# Patient Record
Sex: MALE | Race: WHITE | NOT HISPANIC OR LATINO | Employment: OTHER | URBAN - METROPOLITAN AREA
[De-identification: names, ages, dates, MRNs, and addresses within clinical notes are randomized per-mention and may not be internally consistent; named-entity substitution may affect disease eponyms.]

---

## 2018-02-01 ENCOUNTER — RECORDS - HEALTHEAST (OUTPATIENT)
Dept: LAB | Facility: CLINIC | Age: 83
End: 2018-02-01

## 2018-02-01 LAB
ALBUMIN SERPL-MCNC: 3.5 G/DL (ref 3.5–5)
ALP SERPL-CCNC: 133 U/L (ref 45–120)
ALT SERPL W P-5'-P-CCNC: 23 U/L (ref 0–45)
ANION GAP SERPL CALCULATED.3IONS-SCNC: 11 MMOL/L (ref 5–18)
AST SERPL W P-5'-P-CCNC: 19 U/L (ref 0–40)
BILIRUB SERPL-MCNC: 0.8 MG/DL (ref 0–1)
BUN SERPL-MCNC: 24 MG/DL (ref 8–28)
CALCIUM SERPL-MCNC: 9.4 MG/DL (ref 8.5–10.5)
CHLORIDE BLD-SCNC: 97 MMOL/L (ref 98–107)
CO2 SERPL-SCNC: 26 MMOL/L (ref 22–31)
CREAT SERPL-MCNC: 1.2 MG/DL (ref 0.7–1.3)
GFR SERPL CREATININE-BSD FRML MDRD: 58 ML/MIN/1.73M2
GLUCOSE BLD-MCNC: 115 MG/DL (ref 70–125)
POTASSIUM BLD-SCNC: 4.3 MMOL/L (ref 3.5–5)
PROT SERPL-MCNC: 7.1 G/DL (ref 6–8)
SODIUM SERPL-SCNC: 134 MMOL/L (ref 136–145)

## 2018-07-23 ENCOUNTER — RECORDS - HEALTHEAST (OUTPATIENT)
Dept: LAB | Facility: CLINIC | Age: 83
End: 2018-07-23

## 2018-07-23 LAB
CHOLEST SERPL-MCNC: 150 MG/DL
FASTING STATUS PATIENT QL REPORTED: NO
HDLC SERPL-MCNC: 43 MG/DL
LDLC SERPL CALC-MCNC: 89 MG/DL
TRIGL SERPL-MCNC: 89 MG/DL

## 2018-08-13 ENCOUNTER — RECORDS - HEALTHEAST (OUTPATIENT)
Dept: ADMINISTRATIVE | Facility: OTHER | Age: 83
End: 2018-08-13

## 2018-08-14 ENCOUNTER — OFFICE VISIT - HEALTHEAST (OUTPATIENT)
Dept: CARDIOLOGY | Facility: CLINIC | Age: 83
End: 2018-08-14

## 2018-08-14 DIAGNOSIS — I10 ESSENTIAL HYPERTENSION: ICD-10-CM

## 2018-08-14 DIAGNOSIS — I10 BENIGN ESSENTIAL HYPERTENSION: ICD-10-CM

## 2018-08-14 ASSESSMENT — MIFFLIN-ST. JEOR: SCORE: 1523.14

## 2018-08-31 ENCOUNTER — RECORDS - HEALTHEAST (OUTPATIENT)
Dept: LAB | Facility: CLINIC | Age: 83
End: 2018-08-31

## 2018-08-31 LAB
ALBUMIN SERPL-MCNC: 3.8 G/DL (ref 3.5–5)
ALP SERPL-CCNC: 123 U/L (ref 45–120)
ALT SERPL W P-5'-P-CCNC: 16 U/L (ref 0–45)
ANION GAP SERPL CALCULATED.3IONS-SCNC: 8 MMOL/L (ref 5–18)
AST SERPL W P-5'-P-CCNC: 19 U/L (ref 0–40)
BILIRUB SERPL-MCNC: 0.7 MG/DL (ref 0–1)
BUN SERPL-MCNC: 25 MG/DL (ref 8–28)
CALCIUM SERPL-MCNC: 9.9 MG/DL (ref 8.5–10.5)
CHLORIDE BLD-SCNC: 105 MMOL/L (ref 98–107)
CO2 SERPL-SCNC: 26 MMOL/L (ref 22–31)
CREAT SERPL-MCNC: 1.25 MG/DL (ref 0.7–1.3)
GFR SERPL CREATININE-BSD FRML MDRD: 55 ML/MIN/1.73M2
GLUCOSE BLD-MCNC: 92 MG/DL (ref 70–125)
POTASSIUM BLD-SCNC: 4.5 MMOL/L (ref 3.5–5)
PROT SERPL-MCNC: 7.2 G/DL (ref 6–8)
SODIUM SERPL-SCNC: 139 MMOL/L (ref 136–145)

## 2018-09-04 LAB — 25(OH)D3 SERPL-MCNC: 39.7 NG/ML (ref 30–80)

## 2018-11-16 ENCOUNTER — RECORDS - HEALTHEAST (OUTPATIENT)
Dept: LAB | Facility: CLINIC | Age: 83
End: 2018-11-16

## 2018-11-16 LAB
ALBUMIN SERPL-MCNC: 3.5 G/DL (ref 3.5–5)
ANION GAP SERPL CALCULATED.3IONS-SCNC: 10 MMOL/L (ref 5–18)
BUN SERPL-MCNC: 28 MG/DL (ref 8–28)
CALCIUM SERPL-MCNC: 9.4 MG/DL (ref 8.5–10.5)
CHLORIDE BLD-SCNC: 100 MMOL/L (ref 98–107)
CO2 SERPL-SCNC: 26 MMOL/L (ref 22–31)
CREAT SERPL-MCNC: 1.42 MG/DL (ref 0.7–1.3)
GFR SERPL CREATININE-BSD FRML MDRD: 48 ML/MIN/1.73M2
GLUCOSE BLD-MCNC: 102 MG/DL (ref 70–125)
PHOSPHATE SERPL-MCNC: 2.7 MG/DL (ref 2.5–4.5)
POTASSIUM BLD-SCNC: 4.1 MMOL/L (ref 3.5–5)
SODIUM SERPL-SCNC: 136 MMOL/L (ref 136–145)

## 2019-02-25 ENCOUNTER — RECORDS - HEALTHEAST (OUTPATIENT)
Dept: LAB | Facility: CLINIC | Age: 84
End: 2019-02-25

## 2019-02-25 LAB
ALBUMIN SERPL-MCNC: 3.8 G/DL (ref 3.5–5)
ANION GAP SERPL CALCULATED.3IONS-SCNC: 10 MMOL/L (ref 5–18)
BUN SERPL-MCNC: 23 MG/DL (ref 8–28)
CALCIUM SERPL-MCNC: 9.5 MG/DL (ref 8.5–10.5)
CHLORIDE BLD-SCNC: 98 MMOL/L (ref 98–107)
CO2 SERPL-SCNC: 25 MMOL/L (ref 22–31)
CREAT SERPL-MCNC: 1.3 MG/DL (ref 0.7–1.3)
GFR SERPL CREATININE-BSD FRML MDRD: 53 ML/MIN/1.73M2
GLUCOSE BLD-MCNC: 86 MG/DL (ref 70–125)
PHOSPHATE SERPL-MCNC: 3.2 MG/DL (ref 2.5–4.5)
POTASSIUM BLD-SCNC: 4.5 MMOL/L (ref 3.5–5)
PSA SERPL-MCNC: 0.5 NG/ML (ref 0–6.5)
SODIUM SERPL-SCNC: 133 MMOL/L (ref 136–145)

## 2019-09-12 ENCOUNTER — COMMUNICATION - HEALTHEAST (OUTPATIENT)
Dept: CARDIOLOGY | Facility: CLINIC | Age: 84
End: 2019-09-12

## 2019-09-12 DIAGNOSIS — I10 ESSENTIAL HYPERTENSION: ICD-10-CM

## 2019-09-12 RX ORDER — LISINOPRIL 20 MG/1
TABLET ORAL
Qty: 90 TABLET | Refills: 0 | Status: SHIPPED | OUTPATIENT
Start: 2019-09-12 | End: 2023-01-01

## 2019-10-01 ENCOUNTER — AMBULATORY - HEALTHEAST (OUTPATIENT)
Dept: CARDIOLOGY | Facility: CLINIC | Age: 84
End: 2019-10-01

## 2019-10-01 ENCOUNTER — RECORDS - HEALTHEAST (OUTPATIENT)
Dept: ADMINISTRATIVE | Facility: OTHER | Age: 84
End: 2019-10-01

## 2019-12-10 ENCOUNTER — COMMUNICATION - HEALTHEAST (OUTPATIENT)
Dept: CARDIOLOGY | Facility: CLINIC | Age: 84
End: 2019-12-10

## 2019-12-10 DIAGNOSIS — I10 ESSENTIAL HYPERTENSION: ICD-10-CM

## 2019-12-13 ENCOUNTER — COMMUNICATION - HEALTHEAST (OUTPATIENT)
Dept: CARDIOLOGY | Facility: CLINIC | Age: 84
End: 2019-12-13

## 2019-12-13 DIAGNOSIS — I10 ESSENTIAL HYPERTENSION: ICD-10-CM

## 2019-12-20 ENCOUNTER — COMMUNICATION - HEALTHEAST (OUTPATIENT)
Dept: CARDIOLOGY | Facility: CLINIC | Age: 84
End: 2019-12-20

## 2019-12-20 DIAGNOSIS — I10 ESSENTIAL HYPERTENSION: ICD-10-CM

## 2020-01-22 ENCOUNTER — RECORDS - HEALTHEAST (OUTPATIENT)
Dept: LAB | Facility: CLINIC | Age: 85
End: 2020-01-22

## 2020-01-22 LAB
ALBUMIN SERPL-MCNC: 3.6 G/DL (ref 3.5–5)
ALP SERPL-CCNC: 114 U/L (ref 45–120)
ALT SERPL W P-5'-P-CCNC: 14 U/L (ref 0–45)
ANION GAP SERPL CALCULATED.3IONS-SCNC: 8 MMOL/L (ref 5–18)
AST SERPL W P-5'-P-CCNC: 17 U/L (ref 0–40)
BILIRUB SERPL-MCNC: 0.7 MG/DL (ref 0–1)
BUN SERPL-MCNC: 20 MG/DL (ref 8–28)
CALCIUM SERPL-MCNC: 8.9 MG/DL (ref 8.5–10.5)
CHLORIDE BLD-SCNC: 96 MMOL/L (ref 98–107)
CO2 SERPL-SCNC: 27 MMOL/L (ref 22–31)
CREAT SERPL-MCNC: 1.28 MG/DL (ref 0.7–1.3)
GFR SERPL CREATININE-BSD FRML MDRD: 53 ML/MIN/1.73M2
GLUCOSE BLD-MCNC: 118 MG/DL (ref 70–125)
POTASSIUM BLD-SCNC: 3.8 MMOL/L (ref 3.5–5)
PROT SERPL-MCNC: 6.4 G/DL (ref 6–8)
SODIUM SERPL-SCNC: 131 MMOL/L (ref 136–145)

## 2020-02-17 ENCOUNTER — AMBULATORY - HEALTHEAST (OUTPATIENT)
Dept: OTHER | Facility: CLINIC | Age: 85
End: 2020-02-17

## 2020-02-19 ENCOUNTER — RECORDS - HEALTHEAST (OUTPATIENT)
Dept: ADMINISTRATIVE | Facility: OTHER | Age: 85
End: 2020-02-19

## 2020-02-19 ENCOUNTER — AMBULATORY - HEALTHEAST (OUTPATIENT)
Dept: CARDIOLOGY | Facility: CLINIC | Age: 85
End: 2020-02-19

## 2020-02-27 ENCOUNTER — OFFICE VISIT - HEALTHEAST (OUTPATIENT)
Dept: CARDIOLOGY | Facility: CLINIC | Age: 85
End: 2020-02-27

## 2020-02-27 DIAGNOSIS — I95.1 ORTHOSTATIC HYPOTENSION: ICD-10-CM

## 2020-02-27 DIAGNOSIS — I10 BENIGN ESSENTIAL HYPERTENSION: ICD-10-CM

## 2020-02-27 ASSESSMENT — MIFFLIN-ST. JEOR: SCORE: 1509.54

## 2020-03-16 ENCOUNTER — AMBULATORY - HEALTHEAST (OUTPATIENT)
Dept: GERIATRICS | Facility: CLINIC | Age: 85
End: 2020-03-16

## 2020-03-16 ENCOUNTER — OFFICE VISIT - HEALTHEAST (OUTPATIENT)
Dept: GERIATRICS | Facility: CLINIC | Age: 85
End: 2020-03-16

## 2020-03-16 DIAGNOSIS — R55 VASOVAGAL SYNCOPE: ICD-10-CM

## 2020-03-16 DIAGNOSIS — K21.9 GASTROESOPHAGEAL REFLUX DISEASE WITHOUT ESOPHAGITIS: ICD-10-CM

## 2020-03-16 DIAGNOSIS — M00.062 STAPHYLOCOCCAL ARTHRITIS OF LEFT KNEE (H): ICD-10-CM

## 2020-03-16 DIAGNOSIS — F51.01 PRIMARY INSOMNIA: ICD-10-CM

## 2020-03-16 DIAGNOSIS — I10 BENIGN ESSENTIAL HYPERTENSION: ICD-10-CM

## 2020-03-16 DIAGNOSIS — A41.9 SEPTICEMIA (H): ICD-10-CM

## 2020-03-16 RX ORDER — ACETAMINOPHEN 500 MG
1000 TABLET ORAL EVERY 6 HOURS PRN
Status: SHIPPED | COMMUNITY
Start: 2020-03-16

## 2020-03-16 RX ORDER — LANOLIN ALCOHOL/MO/W.PET/CERES
3 CREAM (GRAM) TOPICAL
Status: SHIPPED | COMMUNITY
Start: 2020-03-16

## 2020-03-17 ENCOUNTER — OFFICE VISIT - HEALTHEAST (OUTPATIENT)
Dept: GERIATRICS | Facility: CLINIC | Age: 85
End: 2020-03-17

## 2020-03-17 ENCOUNTER — RECORDS - HEALTHEAST (OUTPATIENT)
Dept: LAB | Facility: CLINIC | Age: 85
End: 2020-03-17

## 2020-03-17 DIAGNOSIS — Z96.652 HISTORY OF TOTAL LEFT KNEE REPLACEMENT: ICD-10-CM

## 2020-03-17 DIAGNOSIS — E78.5 HYPERLIPIDEMIA, UNSPECIFIED HYPERLIPIDEMIA TYPE: ICD-10-CM

## 2020-03-17 DIAGNOSIS — I10 BENIGN ESSENTIAL HYPERTENSION: ICD-10-CM

## 2020-03-17 DIAGNOSIS — R74.01 TRANSAMINITIS: ICD-10-CM

## 2020-03-17 DIAGNOSIS — D64.9 ANEMIA, UNSPECIFIED TYPE: ICD-10-CM

## 2020-03-17 DIAGNOSIS — E87.1 HYPONATREMIA: ICD-10-CM

## 2020-03-18 ENCOUNTER — COMMUNICATION - HEALTHEAST (OUTPATIENT)
Dept: GERIATRICS | Facility: CLINIC | Age: 85
End: 2020-03-18

## 2020-03-18 LAB
ALBUMIN SERPL-MCNC: 2.4 G/DL (ref 3.5–5)
ALP SERPL-CCNC: 140 U/L (ref 45–120)
ALT SERPL W P-5'-P-CCNC: 25 U/L (ref 0–45)
ANION GAP SERPL CALCULATED.3IONS-SCNC: 7 MMOL/L (ref 5–18)
AST SERPL W P-5'-P-CCNC: 33 U/L (ref 0–40)
BILIRUB SERPL-MCNC: 0.3 MG/DL (ref 0–1)
BUN SERPL-MCNC: 14 MG/DL (ref 8–28)
CALCIUM SERPL-MCNC: 7.9 MG/DL (ref 8.5–10.5)
CHLORIDE BLD-SCNC: 99 MMOL/L (ref 98–107)
CO2 SERPL-SCNC: 26 MMOL/L (ref 22–31)
CREAT SERPL-MCNC: 0.97 MG/DL (ref 0.7–1.3)
ERYTHROCYTE [DISTWIDTH] IN BLOOD BY AUTOMATED COUNT: 14.6 % (ref 11–14.5)
GFR SERPL CREATININE-BSD FRML MDRD: >60 ML/MIN/1.73M2
GLUCOSE BLD-MCNC: 107 MG/DL (ref 70–125)
HCT VFR BLD AUTO: 20 % (ref 40–54)
HGB BLD-MCNC: 6.6 G/DL (ref 14–18)
MAGNESIUM SERPL-MCNC: 1.7 MG/DL (ref 1.8–2.6)
MCH RBC QN AUTO: 32.4 PG (ref 27–34)
MCHC RBC AUTO-ENTMCNC: 33 G/DL (ref 32–36)
MCV RBC AUTO: 98 FL (ref 80–100)
PLATELET # BLD AUTO: 490 THOU/UL (ref 140–440)
PMV BLD AUTO: 9.2 FL (ref 8.5–12.5)
POTASSIUM BLD-SCNC: 3.7 MMOL/L (ref 3.5–5)
PROT SERPL-MCNC: 5.4 G/DL (ref 6–8)
RBC # BLD AUTO: 2.04 MILL/UL (ref 4.4–6.2)
SODIUM SERPL-SCNC: 132 MMOL/L (ref 136–145)
WBC: 10 THOU/UL (ref 4–11)

## 2020-03-18 ASSESSMENT — MIFFLIN-ST. JEOR: SCORE: 1490.03

## 2020-03-19 ENCOUNTER — SURGERY - HEALTHEAST (OUTPATIENT)
Dept: SURGERY | Facility: CLINIC | Age: 85
End: 2020-03-19

## 2020-03-19 ENCOUNTER — ANESTHESIA - HEALTHEAST (OUTPATIENT)
Dept: SURGERY | Facility: CLINIC | Age: 85
End: 2020-03-19

## 2020-03-20 ASSESSMENT — MIFFLIN-ST. JEOR: SCORE: 1453.29

## 2020-03-23 ENCOUNTER — RECORDS - HEALTHEAST (OUTPATIENT)
Dept: LAB | Facility: CLINIC | Age: 85
End: 2020-03-23

## 2020-03-23 ENCOUNTER — OFFICE VISIT - HEALTHEAST (OUTPATIENT)
Dept: GERIATRICS | Facility: CLINIC | Age: 85
End: 2020-03-23

## 2020-03-23 DIAGNOSIS — A41.9 SEPTICEMIA (H): ICD-10-CM

## 2020-03-23 DIAGNOSIS — D62 ANEMIA DUE TO BLOOD LOSS, ACUTE: ICD-10-CM

## 2020-03-23 DIAGNOSIS — M17.12 ARTHRITIS OF LEFT KNEE: ICD-10-CM

## 2020-03-24 ENCOUNTER — OFFICE VISIT - HEALTHEAST (OUTPATIENT)
Dept: GERIATRICS | Facility: CLINIC | Age: 85
End: 2020-03-24

## 2020-03-24 DIAGNOSIS — K92.2 UPPER GI BLEED: ICD-10-CM

## 2020-03-24 DIAGNOSIS — Z96.652 HISTORY OF TOTAL LEFT KNEE REPLACEMENT: ICD-10-CM

## 2020-03-24 DIAGNOSIS — I10 ESSENTIAL HYPERTENSION, BENIGN: ICD-10-CM

## 2020-03-24 DIAGNOSIS — D62 ANEMIA DUE TO BLOOD LOSS, ACUTE: ICD-10-CM

## 2020-03-24 DIAGNOSIS — M00.062 STAPHYLOCOCCAL ARTHRITIS OF LEFT KNEE (H): ICD-10-CM

## 2020-03-25 LAB — H PYLORI AG STL QL IA: NEGATIVE

## 2020-03-30 ENCOUNTER — OFFICE VISIT - HEALTHEAST (OUTPATIENT)
Dept: GERIATRICS | Facility: CLINIC | Age: 85
End: 2020-03-30

## 2020-03-30 ENCOUNTER — RECORDS - HEALTHEAST (OUTPATIENT)
Dept: LAB | Facility: CLINIC | Age: 85
End: 2020-03-30

## 2020-03-30 DIAGNOSIS — K92.2 UPPER GI BLEED: ICD-10-CM

## 2020-03-30 DIAGNOSIS — M00.062 STAPHYLOCOCCAL ARTHRITIS OF LEFT KNEE (H): ICD-10-CM

## 2020-03-31 LAB
ALBUMIN SERPL-MCNC: 2.9 G/DL (ref 3.5–5)
ALP SERPL-CCNC: 160 U/L (ref 45–120)
ALT SERPL W P-5'-P-CCNC: 13 U/L (ref 0–45)
AST SERPL W P-5'-P-CCNC: 21 U/L (ref 0–40)
BASOPHILS # BLD AUTO: 0.1 THOU/UL (ref 0–0.2)
BASOPHILS NFR BLD AUTO: 2 % (ref 0–2)
BILIRUB DIRECT SERPL-MCNC: 0.2 MG/DL
BILIRUB SERPL-MCNC: 0.5 MG/DL (ref 0–1)
BUN SERPL-MCNC: 16 MG/DL (ref 8–28)
C REACTIVE PROTEIN LHE: 0.3 MG/DL (ref 0–0.8)
CREAT SERPL-MCNC: 0.99 MG/DL (ref 0.7–1.3)
EOSINOPHIL # BLD AUTO: 0.5 THOU/UL (ref 0–0.4)
EOSINOPHIL NFR BLD AUTO: 8 % (ref 0–6)
ERYTHROCYTE [DISTWIDTH] IN BLOOD BY AUTOMATED COUNT: 14.4 % (ref 11–14.5)
GFR SERPL CREATININE-BSD FRML MDRD: >60 ML/MIN/1.73M2
HCT VFR BLD AUTO: 33.1 % (ref 40–54)
HGB BLD-MCNC: 10.6 G/DL (ref 14–18)
LYMPHOCYTES # BLD AUTO: 2 THOU/UL (ref 0.8–4.4)
LYMPHOCYTES NFR BLD AUTO: 30 % (ref 20–40)
MCH RBC QN AUTO: 30.5 PG (ref 27–34)
MCHC RBC AUTO-ENTMCNC: 32 G/DL (ref 32–36)
MCV RBC AUTO: 95 FL (ref 80–100)
MONOCYTES # BLD AUTO: 0.6 THOU/UL (ref 0–0.9)
MONOCYTES NFR BLD AUTO: 9 % (ref 2–10)
NEUTROPHILS # BLD AUTO: 3.3 THOU/UL (ref 2–7.7)
NEUTROPHILS NFR BLD AUTO: 51 % (ref 50–70)
PLATELET # BLD AUTO: 344 THOU/UL (ref 140–440)
PMV BLD AUTO: 10 FL (ref 8.5–12.5)
PROT SERPL-MCNC: 6.4 G/DL (ref 6–8)
RBC # BLD AUTO: 3.48 MILL/UL (ref 4.4–6.2)
WBC: 6.5 THOU/UL (ref 4–11)

## 2020-04-01 ENCOUNTER — AMBULATORY - HEALTHEAST (OUTPATIENT)
Dept: GERIATRICS | Facility: CLINIC | Age: 85
End: 2020-04-01

## 2020-06-10 ENCOUNTER — RECORDS - HEALTHEAST (OUTPATIENT)
Dept: LAB | Facility: CLINIC | Age: 85
End: 2020-06-10

## 2020-06-10 LAB
BASOPHILS # BLD AUTO: 0.1 THOU/UL (ref 0–0.2)
BASOPHILS NFR BLD AUTO: 1 % (ref 0–2)
C REACTIVE PROTEIN LHE: 0.2 MG/DL (ref 0–0.8)
CREAT SERPL-MCNC: 1.27 MG/DL (ref 0.7–1.3)
EOSINOPHIL # BLD AUTO: 0.2 THOU/UL (ref 0–0.4)
EOSINOPHIL NFR BLD AUTO: 3 % (ref 0–6)
ERYTHROCYTE [DISTWIDTH] IN BLOOD BY AUTOMATED COUNT: 13.9 % (ref 11–14.5)
GFR SERPL CREATININE-BSD FRML MDRD: 54 ML/MIN/1.73M2
HCT VFR BLD AUTO: 40.1 % (ref 40–54)
HGB BLD-MCNC: 12.9 G/DL (ref 14–18)
LYMPHOCYTES # BLD AUTO: 2.6 THOU/UL (ref 0.8–4.4)
LYMPHOCYTES NFR BLD AUTO: 34 % (ref 20–40)
MCH RBC QN AUTO: 30.4 PG (ref 27–34)
MCHC RBC AUTO-ENTMCNC: 32.2 G/DL (ref 32–36)
MCV RBC AUTO: 95 FL (ref 80–100)
MONOCYTES # BLD AUTO: 0.7 THOU/UL (ref 0–0.9)
MONOCYTES NFR BLD AUTO: 10 % (ref 2–10)
NEUTROPHILS # BLD AUTO: 4 THOU/UL (ref 2–7.7)
NEUTROPHILS NFR BLD AUTO: 52 % (ref 50–70)
PLATELET # BLD AUTO: 257 THOU/UL (ref 140–440)
PMV BLD AUTO: 11.2 FL (ref 8.5–12.5)
RBC # BLD AUTO: 4.24 MILL/UL (ref 4.4–6.2)
WBC: 7.6 THOU/UL (ref 4–11)

## 2021-01-06 ENCOUNTER — RECORDS - HEALTHEAST (OUTPATIENT)
Dept: LAB | Facility: CLINIC | Age: 86
End: 2021-01-06

## 2021-01-06 LAB
ANION GAP SERPL CALCULATED.3IONS-SCNC: 10 MMOL/L (ref 5–18)
BUN SERPL-MCNC: 22 MG/DL (ref 8–28)
CALCIUM SERPL-MCNC: 9.3 MG/DL (ref 8.5–10.5)
CHLORIDE BLD-SCNC: 104 MMOL/L (ref 98–107)
CO2 SERPL-SCNC: 25 MMOL/L (ref 22–31)
CREAT SERPL-MCNC: 1.03 MG/DL (ref 0.7–1.3)
GFR SERPL CREATININE-BSD FRML MDRD: >60 ML/MIN/1.73M2
GLUCOSE BLD-MCNC: 94 MG/DL (ref 70–125)
POTASSIUM BLD-SCNC: 4.3 MMOL/L (ref 3.5–5)
SODIUM SERPL-SCNC: 139 MMOL/L (ref 136–145)

## 2021-05-29 ENCOUNTER — RECORDS - HEALTHEAST (OUTPATIENT)
Dept: ADMINISTRATIVE | Facility: CLINIC | Age: 86
End: 2021-05-29

## 2021-05-31 ENCOUNTER — RECORDS - HEALTHEAST (OUTPATIENT)
Dept: ADMINISTRATIVE | Facility: CLINIC | Age: 86
End: 2021-05-31

## 2021-06-01 VITALS — BODY MASS INDEX: 30.61 KG/M2 | WEIGHT: 195 LBS | HEIGHT: 67 IN

## 2021-06-04 VITALS
DIASTOLIC BLOOD PRESSURE: 78 MMHG | BODY MASS INDEX: 31.22 KG/M2 | HEART RATE: 77 BPM | TEMPERATURE: 97.7 F | SYSTOLIC BLOOD PRESSURE: 160 MMHG | WEIGHT: 193.4 LBS

## 2021-06-04 VITALS
HEIGHT: 67 IN | HEART RATE: 80 BPM | WEIGHT: 192 LBS | DIASTOLIC BLOOD PRESSURE: 68 MMHG | SYSTOLIC BLOOD PRESSURE: 134 MMHG | BODY MASS INDEX: 30.13 KG/M2 | RESPIRATION RATE: 16 BRPM

## 2021-06-04 VITALS
DIASTOLIC BLOOD PRESSURE: 70 MMHG | WEIGHT: 193 LBS | SYSTOLIC BLOOD PRESSURE: 168 MMHG | OXYGEN SATURATION: 98 % | HEART RATE: 93 BPM | TEMPERATURE: 97.6 F | BODY MASS INDEX: 30.23 KG/M2 | RESPIRATION RATE: 16 BRPM

## 2021-06-04 VITALS
WEIGHT: 177 LBS | SYSTOLIC BLOOD PRESSURE: 161 MMHG | HEART RATE: 85 BPM | TEMPERATURE: 98 F | DIASTOLIC BLOOD PRESSURE: 76 MMHG | BODY MASS INDEX: 28.57 KG/M2

## 2021-06-04 VITALS — BODY MASS INDEX: 29.43 KG/M2 | HEIGHT: 66 IN | WEIGHT: 183.1 LBS

## 2021-06-04 NOTE — TELEPHONE ENCOUNTER
Refuse refill for Lisinopril. Pt has not seen cardiologist in over 1 year. Contact PCP.  No follow with Cardiology needed.

## 2021-06-04 NOTE — TELEPHONE ENCOUNTER
Refuse refill for Lisinopril. Pt has not seen cardiologist in over 1 year. Contact PCP. No follow up needed with [facility].

## 2021-06-06 NOTE — PROGRESS NOTES
[facility] Admission note      Patient: Javier Carrillo  MRN: [id number]  Date of Service: 3/17/2020      [facility] [[id number]]  Reason for Visit     Chief Complaint   Patient presents with     H & P   Follow-up knee infection, recent hospital stay    Code Status     Full code    Assessment     -Acute septicemia with sepsis with suspicion for left knee septic joint with recent knee replacement status post I&D and poly-exchange on 3/10/2020  -History of traumatic rhabdomyolysis  -Acute kidney injury  -Mild hyponatremia.  -Elevated LFTs with transaminitis  -History of hypertension  -- hld  -Pain management  -Generalized debilitation discharge to the TCU for strengthening and rehab  -Blood loss anemia on discharge hemoglobin stable at 9.6.      Plan     Patient has been admitted to the TCU.  Based on orthopedic recommendation he will be weightbearing as tolerated.  Recommended to keep knee immobilizer on all times to the left lower extremity during ambulation for soft tissue healing  He will continue with ceftriaxone 2 g daily for the next 10 days.  He has a PICC line in place.  He is tolerating his antibiotics well  No recheck labs has been ordered for him plan is to recheck another set of labs on him closely  For DVT prophylaxis he will be on Xarelto 10 mg daily.  Close follow-up with orthopedics.  Close follow-up with infectious disease prior to discontinuation of antibiotics  He has an appointment for tomorrow.  DC his CQ 10 for polypharmacy concern.  Blood pressures are stable now that he is back on lisinopril with no hypotension episode noted.  Continue with his PT OT and rehab.  Family is concerned about his discharge orders he has been given range of movement without immobilizer while in bed which his family is contesting so physical therapy will be contacting orthopedist to clarify order.  Recheck routine labs.  Follow-up with orthopedics closely as scheduled    History      Patient is a very pleasant 85 y.o. male who is admitted to TCU  Patient has a history of left knee arthroplasty and presented with suspicion for left knee septic joint.  He was admitted in the hospital and underwent I&D and poly-exchange on 3/10/2020.  Postoperatively MRSA swab was negative he was growing small amount of staph aureus in the synovial fluid infectious disease was consulted and he has been discharged on antibiotics.  He has been discharged on IV ceftriaxone 2 g IV daily for 10 days    He also developed traumatic rhabdomyolysis with acute renal insufficiency and was given IV hydration with improvement in renal function.  Postoperative course complicated by elevated LFTs this was felt to be systemic response to infection they recommended close monitoring.    Patient had a episode of hypotension associated with a large BM this was felt to be vasovagal in nature and recommended close monitoring.  He developed hyponatremia however sodiums were stable and were monitored.    Due to renal insufficiency lisinopril was held prior to discharge however medication was restarted with advised to monitor kidney functions  He denies any concerns      Past Medical History     Active Ambulatory (Non-Hospital) Problems    Diagnosis     Arthritis of left knee     Hyperlipidemia     Malignant melanoma of upper arm (H)     Osteopenia     History of total knee replacement     Benign essential hypertension     Chest pain     Gastroesophageal reflux disease without esophagitis     Dyspnea on exertion     Orthostatic hypotension     Past Medical History:   Diagnosis Date     Acute hyponatremia      GUSTAVO (acute kidney injury) (H)      GERD (gastroesophageal reflux disease)      Hearing loss      HTN (hypertension)      Hypertriglyceridemia      Melanoma (H) 05/2015     Septicemia (H)      SVT (supraventricular tachycardia) (H) 1999     Traumatic rhabdomyolysis (H)        Past Social History     Reviewed, and he  reports that  he has never smoked. He has never used smokeless tobacco. He reports that he does not drink alcohol or use drugs.    Family History     Reviewed, and family history includes Heart failure (age of onset: 80) in his sister; Pancreatic cancer in his brother.    Medication List   Post Discharge Medication Reconciliation Status: discharge medications reconciled and changed, per note/orders (see AVS)   Current Outpatient Medications on File Prior to Visit   Medication Sig Dispense Refill     acetaminophen (TYLENOL) 500 MG tablet Take 1,000 mg by mouth every 6 (six) hours as needed for pain.       aspirin-acetaminophen-caffeine (EXCEDRIN MIGRAINE) 250-250-65 mg per tablet Take 1 tablet by mouth daily.        calcium carbonate-vitamin D3 (OS-DEVON 250+ D) 250-125 mg-unit Tab per tablet Take 1 tablet by mouth daily.       calcium, as carbonate, (TUMS) 200 mg calcium (500 mg) chewable tablet Chew 2 tablets every 2 (two) hours as needed for heartburn.        cefTRIAXone 2 gram SolR Infuse 2,000 mg into a venous catheter daily.       lisinopril (PRINIVIL,ZESTRIL) 20 MG tablet TAKE 1 TABLET BY MOUTH EVERY DAY 90 tablet 0     melatonin 3 mg Tab tablet Take 3 mg by mouth at bedtime.       omeprazole (PRILOSEC) 20 MG capsule Take 1 capsule (20 mg total) by mouth daily before breakfast. 30 capsule 0     traMADoL (ULTRAM) 50 mg tablet Take 50 mg by mouth every 6 (six) hours as needed for pain.       ubidecarenone (COENZYME Q10) 100 mg Tab tablet Take 100 mg by mouth daily.        Current Facility-Administered Medications on File Prior to Visit   Medication Dose Route Frequency Provider Last Rate Last Dose     [DISCONTINUED] GENERIC EXTERNAL MEDICATION     GENERIC EXTERNAL DATA PROVIDER           Allergies     Allergies   Allergen Reactions     Levaquin [Levofloxacin]      Tendon pain       Review of Systems   A comprehensive review of 14 systems was done. Pertinent findings noted here and in history of present illness. All the rest  negative.  Constitutional: Negative.  Negative for fever, chills, he has activity change, appetite change and fatigue.   HENT: Negative for congestion and facial swelling.    Eyes: Negative for photophobia, redness and visual disturbance.   Respiratory: Negative for cough and chest tightness.    Cardiovascular: Negative for chest pain, palpitations and has chronic  leg swelling.   Gastrointestinal: Negative for nausea, diarrhea, constipation, blood in stool and abdominal distention.   Genitourinary: Negative.    Musculoskeletal: Negative.  Compliant with his left knee immobilizer  Skin: Negative.    Neurological: Negative for dizziness, tremors, syncope, weakness, light-headedness and headaches.   Hematological: Does not bruise/bleed easily.   Psychiatric/Behavioral: Negative.        Physical Exam     Recent Vitals 3/16/2020   Height -   Weight 193 lbs   BSA (m2) 2.03 m2   /70   Pulse 93   Temp 97.6   Temp src -   SpO2 98   Some recent data might be hidden       Constitutional: Oriented to person, place, and time and appears well-developed.   HEENT:  Normocephalic and atraumatic.  Eyes: Conjunctivae and EOM are normal. Pupils are equal, round, and reactive to light. No discharge.  No scleral icterus. Nose normal. Mouth/Throat: Oropharynx is clear and moist. No oropharyngeal exudate.    NECK: Normal range of motion. Neck supple. No JVD present. No tracheal deviation present. No thyromegaly present.   CARDIOVASCULAR: Normal rate, regular rhythm and intact distal pulses.  Exam reveals no gallop and no friction rub.  Systolic murmur present.  PULMONARY: Effort normal and breath sounds normal. No respiratory distress.No Wheezing or rales.  ABDOMEN: Soft. Bowel sounds are normal. No distension and no mass.  There is no tenderness. There is no rebound and no guarding. No HSM.  MUSCULOSKELETAL: Normal range of motion.  He has leg edema and no tenderness. Mild kyphosis, no tenderness.  Left knee has an intact surgical  incision covered with Mepilex with a knee immobilizer noted in place  LYMPH NODES: Has no cervical, supraclavicular, axillary and groin adenopathy.   NEUROLOGICAL: Alert and oriented to person, place, and time. No cranial nerve deficit.  Normal muscle tone. Coordination normal.   GENITOURINARY: Deferred exam.  SKIN: Skin is warm and dry. No rash noted. No erythema. No pallor.   EXTREMITIES: No cyanosis, no clubbing, he has leg edema. No Deformity.  PSYCHIATRIC: Normal mood, affect and behavior.      Lab Results     Recent Results (from the past 240 hour(s))   CK Total    Collection Time: 03/08/20  7:41 PM   Result Value Ref Range    CK, Total 10,121 (HH) 30 - 190 U/L   Comprehensive Metabolic Panel    Collection Time: 03/08/20  7:41 PM   Result Value Ref Range    Sodium 129 (L) 136 - 145 mmol/L    Potassium 4.5 3.5 - 5.0 mmol/L    Chloride 96 (L) 98 - 107 mmol/L    CO2 22 22 - 31 mmol/L    Anion Gap, Calculation 11 5 - 18 mmol/L    Glucose 108 70 - 125 mg/dL    BUN 40 (H) 8 - 28 mg/dL    Creatinine 1.62 (H) 0.70 - 1.30 mg/dL    GFR MDRD Af Amer 49 (L) >60 mL/min/1.73m2    GFR MDRD Non Af Amer 41 (L) >60 mL/min/1.73m2    Bilirubin, Total 0.7 0.0 - 1.0 mg/dL    Calcium 9.1 8.5 - 10.5 mg/dL    Protein, Total 7.1 6.0 - 8.0 g/dL    Albumin 3.1 (L) 3.5 - 5.0 g/dL    Alkaline Phosphatase 131 (H) 45 - 120 U/L     (H) 0 - 40 U/L    ALT 96 (H) 0 - 45 U/L   Lactic Acid    Collection Time: 03/08/20  7:41 PM   Result Value Ref Range    Lactic Acid 1.7 0.5 - 2.2 mmol/L   Blood culture from PERIPHERAL SITE    Collection Time: 03/08/20  7:41 PM    Specimen: Vein, Peripheral; Blood   Result Value Ref Range    Anaerobic Blood Culture Bottle No Growth No Growth, No organisms seen, bottle returned to instrument, Specimen not received, No Growth at 24 hours, No Growth at 48 hours, No Growth at 72 hours, No Growth at 96 hours, No Growth at 120 hours    Aerobic Blood Culture Bottle No Growth No Growth, No organisms seen, bottle  returned to instrument, Specimen not received, No Growth at 24 hours, No Growth at 120 hours, No Growth at 48 hours, No Growth at 72 hours, No Growth at 96 hours   HM1 (CBC with Diff)    Collection Time: 03/08/20  7:41 PM   Result Value Ref Range    WBC 17.5 (H) 4.0 - 11.0 thou/uL    RBC 4.04 (L) 4.40 - 6.20 mill/uL    Hemoglobin 13.0 (L) 14.0 - 18.0 g/dL    Hematocrit 37.6 (L) 40.0 - 54.0 %    MCV 93 80 - 100 fL    MCH 32.2 27.0 - 34.0 pg    MCHC 34.6 32.0 - 36.0 g/dL    RDW 13.4 11.0 - 14.5 %    Platelets 285 140 - 440 thou/uL    MPV 10.1 8.5 - 12.5 fL    Neutrophils % 83 (H) 50 - 70 %    Lymphocytes % 9 (L) 20 - 40 %    Monocytes % 7 2 - 10 %    Eosinophils % 0 0 - 6 %    Basophils % 0 0 - 2 %    Neutrophils Absolute 14.5 (H) 2.0 - 7.7 thou/uL    Lymphocytes Absolute 1.6 0.8 - 4.4 thou/uL    Monocytes Absolute 1.2 (H) 0.0 - 0.9 thou/uL    Eosinophils Absolute 0.1 0.0 - 0.4 thou/uL    Basophils Absolute 0.0 0.0 - 0.2 thou/uL   Magnesium    Collection Time: 03/08/20  7:41 PM   Result Value Ref Range    Magnesium 2.2 1.8 - 2.6 mg/dL   Blood Culture from PERIPHERAL SITE (2nd One)    Collection Time: 03/08/20  8:25 PM    Specimen: Vein, Peripheral; Blood   Result Value Ref Range    Anaerobic Blood Culture Bottle No Growth No Growth, No organisms seen, bottle returned to instrument, Specimen not received, No Growth at 24 hours, No Growth at 48 hours, No Growth at 72 hours, No Growth at 96 hours, No Growth at 120 hours    Aerobic Blood Culture Bottle No Growth No Growth, No organisms seen, bottle returned to instrument, Specimen not received, No Growth at 24 hours, No Growth at 120 hours, No Growth at 48 hours, No Growth at 72 hours, No Growth at 96 hours   Wound Culture/Gram Stain (aerobic)    Collection Time: 03/08/20  8:26 PM    Specimen: Knee, Left; Swab   Result Value Ref Range    Culture 1+ Staphylococcus aureus (!)     Gram Stain Result No polymorphonuclear leukocytes seen     Gram Stain Result No organisms seen         Susceptibility    Staphylococcus aureus - MARISABEL     Clindamycin <=0.5 Sensitive      Cefazolin <=2 Sensitive      Doxycycline <=0.5 Sensitive      Erythromycin <=0.5 Sensitive      Levofloxacin <=0.5 Sensitive      Oxacillin 0.5 Sensitive      Trimethoprim + Sulfamethoxazole <=1/19 Sensitive      Vancomycin 1 Sensitive    Influenza A/B Rapid Test    Collection Time: 03/08/20  8:26 PM    Specimen: Nasopharyngeal Swab; Nasopharyngeal (Inpt/ED) or Nasal Mucosa (Outpt)   Result Value Ref Range    Influenza  A, Rapid Antigen No Influenza A antigen detected No Influenza A antigen detected    Influenza B, Rapid Antigen No Influenza B antigen detected No Influenza B antigen detected   Urinalysis-UC if Indicated    Collection Time: 03/08/20  9:30 PM   Result Value Ref Range    Color, UA Yellow Colorless, Yellow, Straw, Light Yellow    Clarity, UA Clear Clear    Glucose, UA Negative Negative    Bilirubin, UA Negative Negative    Ketones, UA Negative Negative    Specific Gravity, UA 1.005 1.001 - 1.030    Blood, UA Large (!) Negative    pH, UA 5.0 4.5 - 8.0    Protein, UA Negative Negative mg/dL    Urobilinogen, UA <2.0 E.U./dL <2.0 E.U./dL, 2.0 E.U./dL    Nitrite, UA Negative Negative    Leukocytes, UA Negative Negative    Bacteria, UA None Seen None Seen hpf    RBC, UA 0-2 None Seen, 0-2 hpf    WBC, UA 0-5 None Seen, 0-5 hpf    Squam Epithel, UA 0-5 None Seen, 0-5 lpf    Mucus, UA Few (!) None Seen lpf   ECG 12 lead nursing unit performed    Collection Time: 03/08/20  9:54 PM   Result Value Ref Range    SYSTOLIC BLOOD PRESSURE 189 mmHg    DIASTOLIC BLOOD PRESSURE 85 mmHg    VENTRICULAR RATE 111 BPM    ATRIAL RATE 111 BPM    P-R INTERVAL 170 ms    QRS DURATION 64 ms    Q-T INTERVAL 312 ms    QTC CALCULATION (BEZET) 424 ms    P Axis 56 degrees    R AXIS 34 degrees    T AXIS 53 degrees    MUSE DIAGNOSIS       Sinus tachycardia  Low voltage QRS  Cannot rule out Anterior infarct , age undetermined  Abnormal ECG  When compared  with ECG of 14-FEB-2020 13:15,  No significant change was found  Confirmed by SEE ED PROVIDER NOTE FOR, ECG INTERPRETATION ([id number]),  ABDON MONTERO ([id number]) on 3/9/2020 1:13:21 AM              Imaging Results     Ct Abdomen Pelvis Without Oral Without Iv Contrast    Result Date: 3/8/2020  EXAM: CT ABDOMEN PELVIS WO ORAL WO IV CONTRAST LOCATION: [facility] DATE/TIME: 3/8/2020 10:44 PM INDICATION: fever/weakness, decreased appetite today. COMPARISON: 02/24/2014 TECHNIQUE: CT scan of the abdomen and pelvis was performed without oral or IV contrast. Multiplanar reformats were obtained. Dose reduction techniques were used. CONTRAST: None. FINDINGS: LOWER CHEST: Mild basilar atelectasis. HEPATOBILIARY: Grossly stable on noncontrast imaging. PANCREAS: Normal. SPLEEN: Normal. ADRENAL GLANDS: Normal. KIDNEY/BLADDER: Bilateral renal cysts. Subcentimeter renal hypo and hyper densities small for characterization. BOWEL: Normal caliber. Haziness of small bowel mesentery and subcentimeter lymph nodes similar to previous. Diverticulosis. Appendix not seen. LYMPH NODES: Normal. VASCULATURE: Atherosclerotic vascular calcification. PELVIC ORGANS: Normal. MUSCULOSKELETAL: Degenerative change osseous structures.     1.  No inflammatory change, bowel obstruction or abscess. 2.  Haziness root of small bowel mesentery with subcentimeter mesenteric lymph nodes similar to prior. 3.  Diverticulosis. 4.  Renal cysts.    Xr Knee Left 1 Or 2 Vws    Result Date: 3/8/2020  EXAM: XR KNEE LEFT 1 OR 2 VWS LOCATION: [facility] DATE/TIME: 3/8/2020 9:09 PM INDICATION: infection/redness post op january COMPARISON: 02/14/2020. [facility]     Postop left hip arthroplasty. Components appear well seated and well aligned. Tiny knee joint effusion similar to recent previous.    Xr Chest 1 View    Result Date: 3/8/2020  EXAM: XR CHEST 1 VIEW LOCATION: [facility] DATE/TIME: 3/8/2020 9:08 PM INDICATION: fever COMPARISON:  07/17/2018     Negative chest with no change.    Us Venous Leg Left    Result Date: 3/8/2020  EXAM: US VENOUS LEG LEFT LOCATION: [facility] DATE/TIME: 3/8/2020 9:04 PM INDICATION: swelling, post op COMPARISON: None. TECHNIQUE: Venous Duplex ultrasound of the left lower extremity with and without compression, augmentation and duplex. Color flow and spectral Doppler with waveform analysis performed. FINDINGS: Exam includes the common femoral, femoral, popliteal, and contralateral common femoral veins as well as segmentally visualized deep calf veins and greater saphenous vein. LEFT: No deep vein thrombosis. No superficial thrombophlebitis. No popliteal cyst.     1.  No deep venous thrombosis in the left lower extremity. There are 2 fluid collections along the left knee anterior incision site measuring 6.3 x 4.0 x 1.3 cm and 5.9 x 2.2 x 0.4 cm.            LORE Orozco

## 2021-06-06 NOTE — PATIENT INSTRUCTIONS - HE
1. Stop hydrochlorothiazide  (HCTZ)  2. Continue to track your blood pressure.  A beta-blocker may be helpful if your symptoms persist off of diuretics  3. Gradually resume physical activities to improve your cardiovascular fitness.

## 2021-06-06 NOTE — PROGRESS NOTES
[facility]    DATE: 3/16/2020    NAME:  Javier Carrillo             :  1934  MRN: [id number]  CODE STATUS:  POLST AVAILABLE    VISIT TYPE: Problem Visit (hospital f/u)     FACILITY:  [facility] [[id number]]       CHIEF COMPLAIN/REASON FOR VISIT:    Chief Complaint   Patient presents with     Problem Visit     hospital f/u               HISTORY OF PRESENT ILLNESS: Javier Carrillo is a 85 y.o. male who was admitted to [facility] 3/9-3/13 for sepsis, Left knee septic joint. He underwent I&D exchange on 3/10. His cultures showed staph aureus, MSSA. He was on ancef and vanco then later ceftriaxone. He was recommended 2 weeks of IV antibiotics and then follow up with Id. He was also treated for rhabdo, GUSTAVO, hyponatremia. He had an episode of vasovagal syncope in hospital but work up stable. He was discharged to [facility] for further rehab. He has PMH of HTN.     Today Mr. Carrillo is seen for hospital follow up and admission to TCU visit. He is seen in his room with nursing present for part of visit today. He says his stools are very black and he is concerned about this. He is not having any abdominal pain or nausea but does have some upset stomach relating to his pain meds. He says the stool is just so black, not tarry like the black color of his tv. Reviewed his med list and he is not on iron supplement. He says he did not recall being on iron. He had a bm yesterday. His appetite is fair depending on the food. He is concerned about his stomach upset from the tramadol and thinks even tylenol might upset his stomach but not sure. He has been taking tums intermittently when he can get it. He has not tried taking his pain meds with food. He does request a sleep aid tonight. He has an ortho appt tomorrow and Id on Wednesday. He denies any other medication concerns.     REVIEW OF SYSTEMS:  PROBLEMS AND REVIEW OF SYSTEMS:   Today on ROS:   Currently, no  fever, chills, or rigors. Decreased vision and hearing. Denies any chest pain, headaches, palpitations, lightheadedness, dizziness, shortness of breath, or cough. Appetite is fair. Denies any difficulty with swallowing, nausea, or vomiting.  Denies any abdominal pain, diarrhea or constipation. Denies any urinary symptoms.  No active bleeding. No rash. Positive for weakness, black stools, upset stomach, right arm picc line, knee pain, knee incision, no further dizziness or syncope, leg swelling, insomnia      Allergies   Allergen Reactions     Levaquin [Levofloxacin]      Tendon pain     No current outpatient medications on file.     Past Medical History:    Past Medical History:   Diagnosis Date     Acute hyponatremia      GUSTAVO (acute kidney injury) (H)      GERD (gastroesophageal reflux disease)      Hearing loss      HTN (hypertension)      Hypertriglyceridemia      Melanoma (H) 05/2015    Rt Bicep     Septicemia (H)      SVT (supraventricular tachycardia) (H) 1999     Traumatic rhabdomyolysis (H)            PHYSICAL EXAMINATION  Vitals:    03/16/20 0700   BP: 168/70   Pulse: 93   Resp: 16   Temp: 97.6  F (36.4  C)   SpO2: 98%   Weight: 193 lb (87.5 kg)       Today on physical exam:     GENERAL: Awake, Alert, oriented x3, not in any form of acute distress, answers questions appropriately, follows simple commands, conversant  HEENT: Head is normocephalic with normal hair distribution. No evidence of trauma. Ears: No acute purulent discharge. Eyes: Conjunctivae pink with no scleral jaundice. Nose: Normal mucosa and septum. NECK: Supple with no cervical or supraclavicular lymphadenopathy. Trachea is midline. Decreased vision and hearing  CHEST: No tenderness or deformity, no crepitus  LUNG: dim to auscultation with good chest expansion. There are no crackles or wheezes, normal AP diameter.  BACK: No kyphosis of the thoracic spine. Symmetric, no curvature, ROM normal, no CVA tenderness, no spinal tenderness   CVS:  There is good S1  S2, regular rhythm, there are no murmurs, rubs, gallops, or heaves,  2+ pulses symmetric in all extremities.  ABDOMEN: Rounded and soft, nontender to palpation, non distended, no masses, no organomegaly, good bowel sounds, no rebound or guarding, no peritoneal signs.   EXTREMITIES: Left knee incision, immobilizer in place, 2+ edema, some numbness, pressure in lower leg area  SKIN: Warm and dry, no erythema noted.  Skin color, texture, no rashes or lesions.  NEUROLOGICAL: The patient is oriented to person, place and time.             LABS:   Recent Results (from the past 168 hour(s))   HM2(CBC w/o Differential)   Result Value Ref Range    WBC 10.0 4.0 - 11.0 thou/uL    RBC 2.04 (L) 4.40 - 6.20 mill/uL    Hemoglobin 6.6 (LL) 14.0 - 18.0 g/dL    Hematocrit 20.0 (L) 40.0 - 54.0 %    MCV 98 80 - 100 fL    MCH 32.4 27.0 - 34.0 pg    MCHC 33.0 32.0 - 36.0 g/dL    RDW 14.6 (H) 11.0 - 14.5 %    Platelets 490 (H) 140 - 440 thou/uL    MPV 9.2 8.5 - 12.5 fL   Comprehensive Metabolic Panel   Result Value Ref Range    Sodium 132 (L) 136 - 145 mmol/L    Potassium 3.7 3.5 - 5.0 mmol/L    Chloride 99 98 - 107 mmol/L    CO2 26 22 - 31 mmol/L    Anion Gap, Calculation 7 5 - 18 mmol/L    Glucose 107 70 - 125 mg/dL    BUN 14 8 - 28 mg/dL    Creatinine 0.97 0.70 - 1.30 mg/dL    GFR MDRD Af Amer >60 >60 mL/min/1.73m2    GFR MDRD Non Af Amer >60 >60 mL/min/1.73m2    Bilirubin, Total 0.3 0.0 - 1.0 mg/dL    Calcium 7.9 (L) 8.5 - 10.5 mg/dL    Protein, Total 5.4 (L) 6.0 - 8.0 g/dL    Albumin 2.4 (L) 3.5 - 5.0 g/dL    Alkaline Phosphatase 140 (H) 45 - 120 U/L    AST 33 0 - 40 U/L    ALT 25 0 - 45 U/L   Magnesium   Result Value Ref Range    Magnesium 1.7 (L) 1.8 - 2.6 mg/dL   POCT occult blood stool   Result Value Ref Range    POC Fecal Occult Bld Positive (!) Negative    Lot Number [id number]     Expiration Date 2/22     Diluent/Developer Lot Number [id number]     Diluent/Developer Expiration Date 2022/10     Pos Control Valid  Control Valid Control    Neg Control Valid Control Valid Control   APTT(PTT)   Result Value Ref Range    PTT 26 24 - 37 seconds   INR   Result Value Ref Range    INR 1.12 (H) 0.90 - 1.10   Troponin I   Result Value Ref Range    Troponin I <0.01 0.00 - 0.29 ng/mL   Magnesium   Result Value Ref Range    Magnesium 1.7 (L) 1.8 - 2.6 mg/dL   Comprehensive Metabolic Panel   Result Value Ref Range    Sodium 130 (L) 136 - 145 mmol/L    Potassium 3.7 3.5 - 5.0 mmol/L    Chloride 98 98 - 107 mmol/L    CO2 27 22 - 31 mmol/L    Anion Gap, Calculation 5 5 - 18 mmol/L    Glucose 116 70 - 125 mg/dL    BUN 16 8 - 28 mg/dL    Creatinine 1.01 0.70 - 1.30 mg/dL    GFR MDRD Af Amer >60 >60 mL/min/1.73m2    GFR MDRD Non Af Amer >60 >60 mL/min/1.73m2    Bilirubin, Total 0.2 0.0 - 1.0 mg/dL    Calcium 7.8 (L) 8.5 - 10.5 mg/dL    Protein, Total 5.4 (L) 6.0 - 8.0 g/dL    Albumin 2.5 (L) 3.5 - 5.0 g/dL    Alkaline Phosphatase 140 (H) 45 - 120 U/L    AST 29 0 - 40 U/L    ALT 23 0 - 45 U/L   BNP(B-type Natriuretic Peptide)   Result Value Ref Range    BNP 57 0 - 93 pg/mL   HM1 (CBC with Diff)   Result Value Ref Range    WBC 12.3 (H) 4.0 - 11.0 thou/uL    RBC 1.99 (L) 4.40 - 6.20 mill/uL    Hemoglobin 6.3 (LL) 14.0 - 18.0 g/dL    Hematocrit 18.8 (L) 40.0 - 54.0 %    MCV 95 80 - 100 fL    MCH 31.7 27.0 - 34.0 pg    MCHC 33.5 32.0 - 36.0 g/dL    RDW 14.5 11.0 - 14.5 %    Platelets 511 (H) 140 - 440 thou/uL    MPV 8.9 8.5 - 12.5 fL    Neutrophils % 76 (H) 50 - 70 %    Lymphocytes % 13 (L) 20 - 40 %    Monocytes % 7 2 - 10 %    Eosinophils % 3 0 - 6 %    Basophils % 1 0 - 2 %    Neutrophils Absolute 9.4 (H) 2.0 - 7.7 thou/uL    Lymphocytes Absolute 1.6 0.8 - 4.4 thou/uL    Monocytes Absolute 0.8 0.0 - 0.9 thou/uL    Eosinophils Absolute 0.3 0.0 - 0.4 thou/uL    Basophils Absolute 0.1 0.0 - 0.2 thou/uL   Type and Screen   Result Value Ref Range    ABORh B POS     Antibody Screen Negative    Crossmatch   Result Value Ref Range    Crossmatch Compatible      Unit Type B Pos     Unit Number [id number]     Status Issued     Component Red Blood Cells     PRODUCT CODE [id number]     Issue Date and Time [id number]     Blood Type 7300     CODING SYSTEM [id number]    Crossmatch   Result Value Ref Range    Crossmatch Compatible     Unit Type B Pos     Unit Number [id number]     Status Issued     Component Red Blood Cells     PRODUCT CODE [id number]     Issue Date and Time [id number]     Blood Type 7300     CODING SYSTEM [id number]      Results for orders placed or performed during the hospital encounter of 02/14/20   Basic Metabolic Panel   Result Value Ref Range    Sodium 129 (L) 136 - 145 mmol/L    Potassium 4.2 3.5 - 5.0 mmol/L    Chloride 97 (L) 98 - 107 mmol/L    CO2 24 22 - 31 mmol/L    Anion Gap, Calculation 8 5 - 18 mmol/L    Glucose 123 70 - 125 mg/dL    Calcium 8.7 8.5 - 10.5 mg/dL    BUN 30 (H) 8 - 28 mg/dL    Creatinine 1.36 (H) 0.70 - 1.30 mg/dL    GFR MDRD Af Amer 60 (L) >60 mL/min/1.73m2    GFR MDRD Non Af Amer 50 (L) >60 mL/min/1.73m2         Lab Results   Component Value Date    WBC 12.3 (H) 03/18/2020    HGB 6.3 (LL) 03/18/2020    HCT 18.8 (L) 03/18/2020    MCV 95 03/18/2020     (H) 03/18/2020       No results found for: [id number]  No results found for: HGBA1C  Lab Results   Component Value Date    INR 1.12 (H) 03/18/2020    INR 1.02 07/17/2018    INR 1.48 (H) 07/21/2012     Vitamin D, Total (25-Hydroxy)   Date Value Ref Range Status   08/31/2018 39.7 30.0 - 80.0 ng/mL Final     Lab Results   Component Value Date    TSH 1.53 07/18/2018           ASSESSMENT/PLAN:    Left knee septic joint, Staph aureus MSSA: Incision c/d/i, 2+ edema, immobilizer in place to be on at all times. Pain controlled on current regimen. IV antibiotics ceftriaxone x 2 weeks, f/u with ID on 3/18 for further management. F/u with ortho on 3/17. PT, OT following. Will add tylenol prn to use first and tramadol 2nd line. Give with food and added tums, omeprazole to help with  stomach upset.   ?GI bleed: reporting black stools. No iron supplement, no h/o gi bleeding. Omeprazole prn will change to daily scheduled. Monitor hg. Occult stool x 3 notify if positive. Discussed with  Him potential cause of black stools and monitoring needed. Discussed to notify immediately if develop dizziness, syncope, dark and tarry stools, abdominal pain, nausea and vomiting. Discussed with nursing to monitor as well and notify if occult stool positive. Hg 10-9 in hospital. No anticoagulants noted.   Insomnia: Add melatonin at bedtime.   HTN: SBP 160s: Controlled on current regimen.   GERD: reports gi upset with tramadol, encouraged to take with food. Ordered tylenol prn. Changed omeprazole to daily scheduled, tums prn.   Vasovagal syncope: episode in hospital felt to be from straining. Monitor for recurrence.     Electronically signed by: Ariella Thomson NP    Total floor/unit time spent 40 min with >50% time spent on counseling and coordination of care. Counseling was done regarding gi bleeding, pain management. Coordinated with nursing for monitoring and management of potential gi bleeding, pain management, ortho and ID follow ups.

## 2021-06-07 NOTE — ANESTHESIA POSTPROCEDURE EVALUATION
Patient: Javier Carrillo  Procedure(s):  ESOPHAGOGASTRODUODENOSCOPY (EGD)  Anesthesia type: MAC    Patient location: PACU  Last vitals:   Vitals Value Taken Time   /63 3/19/2020 12:00 PM   Temp 36.3  C (97.3  F) 3/19/2020 12:00 PM   Pulse 75 3/19/2020 12:11 PM   Resp 16 3/19/2020 12:00 PM   SpO2 96 % 3/19/2020 12:00 PM   Vitals shown include unvalidated device data.  Post vital signs: stable  Level of consciousness: awake and responds to simple questions  Post-anesthesia pain: pain controlled  Post-anesthesia nausea and vomiting: no  Pulmonary: unassisted, return to baseline  Cardiovascular: stable and blood pressure at baseline  Hydration: adequate  Anesthetic events: no    QCDR Measures:  ASA# 11 - Keila-op Cardiac Arrest: ASA11B - Patient did NOT experience unanticipated cardiac arrest  ASA# 12 - Keila-op Mortality Rate: ASA12B - Patient did NOT die  ASA# 13 - PACU Re-Intubation Rate: NA - No ETT / LMA used for case  ASA# 10 - Composite Anes Safety: ASA10A - No serious adverse event    Additional Notes:

## 2021-06-07 NOTE — ANESTHESIA PREPROCEDURE EVALUATION
Anesthesia Evaluation      Patient summary reviewed   No history of anesthetic complications     Airway   Mallampati: II  Neck ROM: full   Pulmonary - normal exam   (-) shortness of breath                         Cardiovascular   Exercise tolerance: > or = 4 METS  (+) hypertension, ,     Rhythm: regular  Rate: normal,         Neuro/Psych    (+) neuromuscular disease,      Endo/Other - negative ROS      GI/Hepatic/Renal    (+) GERD,   chronic renal disease,      Other findings: Hg 7, post multiple surgeries, now with GI bleed.      Dental                         Anesthesia Plan  Planned anesthetic: MAC    ASA 3     Anesthetic plan and risks discussed with: patient    Post-op plan: routine recovery

## 2021-06-07 NOTE — TELEPHONE ENCOUNTER
[facility] Nurse Triage Telephone Note      Provider: Oriana Harrison MD  Facility: [facility] Type: TCU    Caller: Janae  Call Back Number:  [phone]    Allergies: Levaquin [levofloxacin]    Reason for call: Nurse reporting Heme 2, CMP, and Mg levels.  Patient was seen by ID today.  Not currently receiving any iron supplement.  Patient has been having black stools.  VS:  T=98.6, P=110, R=20, CV=193/77, O2=99%RA.        Verbal Order/Direction given by Provider: Send to ER due to severe anemia and likely GI bleed.      Provider giving order: Oriana Harrison MD    Verbal order given to: Janae Disla RN

## 2021-06-07 NOTE — ANESTHESIA CARE TRANSFER NOTE
Last vitals:   Vitals:    03/19/20 1200   BP: 142/63   Pulse: 73   Resp: 16   Temp: 36.3  C (97.3  F)   SpO2: 96%     Patient's level of consciousness is awake  Spontaneous respirations: yes  Maintains airway independently: yes  Dentition unchanged: yes  Oropharynx: oropharynx clear of all foreign objects    QCDR Measures:  ASA# 20 - Surgical Safety Checklist: WHO surgical safety checklist completed prior to induction    PQRS# 430 - Adult PONV Prevention: 4558F - Pt received => 2 anti-emetic agents (different classes) preop & intraop  ASA# 8 - Peds PONV Prevention: NA - Not pediatric patient, not GA or 2 or more risk factors NOT present  PQRS# 424 - Keila-op Temp Management: 4559F - At least one body temp DOCUMENTED => 35.5C or 95.9F within required timeframe  PQRS# 426 - PACU Transfer Protocol: - Transfer of care checklist used  ASA# 14 - Acute Post-op Pain: ASA14B - Patient did NOT experience pain >= 7 out of 10

## 2021-06-07 NOTE — PROGRESS NOTES
[facility]      NAME:  Javier Carrillo             :  1934    MRN: [id number]    CODE STATUS:  FULL CODE    FACILITY: [facility] [[id number]]    CHIEF COMPLAIN/REASON FOR VISIT:  Chief Complaint   Patient presents with     Review Of Multiple Medical Conditions     see HPI       HISTORY OF PRESENT ILLNESS: Javier Carrillo is a 85 y.o. male being seen today to initiate care after return from acute care. Orders and assessment completed. Pt originally in TCU S/P septic left knee requiring IVAB. Unfortunately he developed loose tarry stools and his HBG dropped. Sent back to acute care and EGD revealed duodenal ulcers. He received blood transfusion, stabilized and is now back to the TCU. He does have a left sided  velcro brace to leg. WBAT and a right sided PICC line for ABX therapies. Reports pain stable. We reviewed TCU routines as well as his care and med review today.     Allergies   Allergen Reactions     Levaquin [Levofloxacin]      Tendon pain   :     Current Outpatient Medications   Medication Sig     acetaminophen (TYLENOL) 500 MG tablet Take 1,000 mg by mouth every 6 (six) hours as needed for pain.     amLODIPine (NORVASC) 5 MG tablet Take 1 tablet (5 mg total) by mouth daily.     calcium carbonate-vitamin D3 (OS-DEVON 250+ D) 250-125 mg-unit Tab per tablet Take 1 tablet by mouth daily.     calcium, as carbonate, (TUMS) 200 mg calcium (500 mg) chewable tablet Chew 2 tablets every 2 (two) hours as needed for heartburn.      cefTRIAXone 2 gram SolR Infuse 2,000 mg into a venous catheter daily.     lisinopril (PRINIVIL,ZESTRIL) 20 MG tablet TAKE 1 TABLET BY MOUTH EVERY DAY     melatonin 3 mg Tab tablet Take 3 mg by mouth at bedtime.     omeprazole (PRILOSEC) 20 MG capsule Take 1 capsule (20 mg total) by mouth daily before breakfast.     pantoprazole (PROTONIX) 40 MG tablet Take 1 tablet (40 mg total) by mouth 2 (two) times a day before meals.     traMADoL (ULTRAM) 50  mg tablet Take 1 tablet (50 mg total) by mouth every 6 (six) hours as needed for pain.         REVIEW OF SYSTEMS:    Currently, no fever, chills, or rigors. Does not have any visual or hearing problems. Denies any chest pain, headaches, palpitations, lightheadedness, dizziness, shortness of breath, or cough. Appetite is good. Denies any GERD symptoms. Denies any difficulty with swallowing, nausea, or vomiting.  Denies any abdominal pain, diarrhea or constipation. Denies any urinary symptoms. No insomnia. No active bleeding. No rash.       PHYSICAL EXAMINATION:  Vitals:    03/23/20 1912   BP: 160/78   Pulse: 77   Temp: 97.7  F (36.5  C)   Weight: 193 lb 6.4 oz (87.7 kg)         GENERAL: Awake, Alert, oriented x3, not in any form of acute distress, answers questions appropriately, follows simple commands, conversant  HEENT: Head is normocephalic with normal hair distribution. No evidence of trauma. Ears: No acute purulent discharge. Eyes: Conjunctivae pink with no scleral jaundice. Nose: Normal mucosa and septum. NECK: Supple with no cervical or supraclavicular lymphadenopathy. Trachea is midline.   CHEST: No tenderness or deformity, no crepitus  LUNG: Clear to auscultation with good chest expansion. There are no crackles or wheezes, normal AP diameter.  BACK: No kyphosis of the thoracic spine. Symmetric, no curvature, ROM normal, no CVA tenderness, no spinal tenderness   CVS: There is good S1  S2, rhythm is regular.  ABDOMEN: Globular and soft, nontender to palpation, non distended, no masses, no organomegaly, good bowel sounds, no rebound or guarding, no peritoneal signs.   EXTREMITIES:Left leg in velcro brace, FWBAT  no pedal edema, no cyanosis or clubbing, no calf tenderness, normal cap refill, no joint swelling.  SKIN: Warm and dry, no erythema noted, no rashes or lesions.  NEUROLOGICAL: The patient is oriented to person, place and time. Strength and sensation are grossly intact. Face is  symmetric.                    LABS:    Lab Results   Component Value Date    WBC 10.0 03/19/2020    HGB 8.8 (L) 03/20/2020    HCT 23.7 (L) 03/19/2020    MCV 94 03/19/2020     (H) 03/19/2020       Results for orders placed or performed during the hospital encounter of 03/18/20   Basic Metabolic Panel   Result Value Ref Range    Sodium 133 (L) 136 - 145 mmol/L    Potassium 3.7 3.5 - 5.0 mmol/L    Chloride 101 98 - 107 mmol/L    CO2 27 22 - 31 mmol/L    Anion Gap, Calculation 5 5 - 18 mmol/L    Glucose 88 70 - 125 mg/dL    Calcium 7.7 (L) 8.5 - 10.5 mg/dL    BUN 12 8 - 28 mg/dL    Creatinine 1.00 0.70 - 1.30 mg/dL    GFR MDRD Af Amer >60 >60 mL/min/1.73m2    GFR MDRD Non Af Amer >60 >60 mL/min/1.73m2           No results found for: HGBA1C  Vitamin D, Total (25-Hydroxy)   Date Value Ref Range Status   08/31/2018 39.7 30.0 - 80.0 ng/mL Final     No results found for: [id number]    ASSESSMENT/PLAN:  1. Anemia due to blood loss, acute    2. Septicemia (H)    3. Arthritis of left knee      1. Anemia: We will follow hbg, has duodenal ulcer  And blood loss on Protonix two times a day, we will check h pylori stool sample,.    2. Septicemia: See med list, will have ongoing IVAB via right sided PICC, maintained per nursing services.    3. Arthritis of Left knee with sepsis: To wear knee brace, followed by ortho. Ongoing IVAB per nursing. Has full WB status, therapies for ongoing strengthening and safety,      Electronically signed by:  Theresa Fernandez CNP  This progress note was completed using Dragon software and there may be grammatical errors.      45  minutes spent of which greater than  65 % was face to face communication with the patient about above plan of care which included his code status, NCS role in his care on the TCU , TCU routines and general medical condition review.

## 2021-06-07 NOTE — PROGRESS NOTES
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  Recheck LFTs in the hospital were normal  - Pain management.  -Generalized weakness    Plan     Patient has been readmitted to the TCU.  He admitted to the hospital with severe anemia concerning for GI bleeding with concerns of melena.  GI saw him and he underwent an EGD.  He was noticed to have a duodenal ulcer not actively bleeding.  Discharge on Protonix 40 mg twice daily  Advised not to use any NSAID products  Advised not to use any aspirin.  He was given 2 units of packed RBC transfusion.  Hemoglobin at discharge was improved at 8.4.  Due to his underlying history of pyogenic arthritis of his knee he continues on ceftriaxone 2 g daily  Continue with his weekly labs  Outpatient follow-up with orthopedics and infectious disease  Based on infectious disease recommendations he will continue on IV antibiotics till 4/21/2020  Due to elevated systolic blood pressure he is on a higher dose of amlodipine  Monitor blood pressure trends  In light of the recent coronavirus pandemic to minimize nursing contact time with him medication review was done and he has been taken off vitamin D supplementation;   Also continue to hold his CQ 10  Continue with his PT OT and rehab  Follow-up labs  reviewed and his H. pylori antigen testing done in the hospital was negative retest has been ordered for today    History     Patient is a very pleasant 85 y.o. male who is REadmitted to TCU  Patient presented to the hospital with acute anemia with melena.  He was admitted work-up revealed a hemoglobin of 6.6, patient was administered 2 units of packed RBCs with improvement in hemoglobin.  Patient was admitted with upper GI bleed concerns and underwent EGD he was found to have a duodenal ulcer which was not actively bleeding.  He has been placed on Protonix twice daily advised not to take any aspirin or NSAID products.  H. pylori is pending.  He is not on any other blood thinners other than Excedrin with aspirin    He has underlying History of pyogenic arthritis of his knee.  He continues with his IV ceftriaxone 2 g IV daily.  He has a PICC line and he will follow-up with infectious disease and orthopedics.  Currently based on orthopedic recommendation he will continue on his IV antibiotics till 4/21/2020  He is tolerating them well    He had some elevated blood pressures with underlying history of hypertension.  Blood pressures show systolic greater than 170 has been discharged on amlodipine    Pain management reviewed with him he reports his pain is stable he is compliant with his immobilizer    Past Medical History     Active Ambulatory (Non-Hospital) Problems    Diagnosis     Pyogenic arthritis of left knee joint, due to unspecified organism (H)     Anemia due to blood loss, acute     Essential hypertension, benign     Upper GI bleed     UGIB (upper gastrointestinal bleed)     Staphylococcal arthritis of left knee (H)     Insomnia     Vasovagal syncope     Septicemia (H)     Arthritis of left knee     Hyperlipidemia     Malignant melanoma of upper arm (H)     Osteopenia     History of total knee replacement     Benign essential hypertension     Chest pain     Gastroesophageal reflux disease without esophagitis      Dyspnea on exertion     Orthostatic hypotension     Past Medical History:   Diagnosis Date     Acute hyponatremia      GUSTAVO (acute kidney injury) (H)      Chronic kidney disease      GERD (gastroesophageal reflux disease)      Hearing loss      History of transfusion      HTN (hypertension)      Hypertriglyceridemia      Melanoma (H) 05/2015     Septicemia (H)      SVT (supraventricular tachycardia) (H) 1999     Traumatic rhabdomyolysis (H)        Past Social History     Reviewed, and he  reports that he has never smoked. He has never used smokeless tobacco. He reports that he does not drink alcohol or use drugs.    Family History     Reviewed, and family history includes Heart failure (age of onset: 80) in his sister; Pancreatic cancer in his brother.    Medication List   Post Discharge Medication Reconciliation Status: discharge medications reconciled and changed, per note/orders (see AVS)   Current Outpatient Medications on File Prior to Visit   Medication Sig Dispense Refill     acetaminophen (TYLENOL) 500 MG tablet Take 1,000 mg by mouth every 6 (six) hours as needed for pain.       amLODIPine (NORVASC) 5 MG tablet Take 1 tablet (5 mg total) by mouth daily. 30 tablet 0     calcium carbonate-vitamin D3 (OS-DEVON 250+ D) 250-125 mg-unit Tab per tablet Take 1 tablet by mouth daily.       calcium, as carbonate, (TUMS) 200 mg calcium (500 mg) chewable tablet Chew 2 tablets every 2 (two) hours as needed for heartburn.        cefTRIAXone 2 gram SolR Infuse 2,000 mg into a venous catheter daily.       lisinopril (PRINIVIL,ZESTRIL) 20 MG tablet TAKE 1 TABLET BY MOUTH EVERY DAY 90 tablet 0     melatonin 3 mg Tab tablet Take 3 mg by mouth at bedtime.       omeprazole (PRILOSEC) 20 MG capsule Take 1 capsule (20 mg total) by mouth daily before breakfast. 30 capsule 0     pantoprazole (PROTONIX) 40 MG tablet Take 1 tablet (40 mg total) by mouth 2 (two) times a day before meals. 60 tablet 1     traMADoL (ULTRAM) 50 mg tablet  Take 1 tablet (50 mg total) by mouth every 6 (six) hours as needed for pain. 12 tablet 0     No current facility-administered medications on file prior to visit.        Allergies     Allergies   Allergen Reactions     Levaquin [Levofloxacin]      Tendon pain       Review of Systems   A comprehensive review of 14 systems was done. Pertinent findings noted here and in history of present illness. All the rest negative.  Constitutional: Negative.  Negative for fever, chills, has activity change, appetite change and fatigue.   HENT: Negative for congestion and facial swelling.    Eyes: Negative for photophobia, redness and visual disturbance.   Respiratory: Negative for cough and chest tightness.    Cardiovascular: Negative for chest pain, palpitations and leg swelling.   Gastrointestinal: Negative for nausea, diarrhea, constipation, blood in stool and abdominal distention.   Genitourinary: Negative.    Musculoskeletal: Negative.  Left knee incision is intact he is reporting some intermittent swelling  Skin: Negative.    Neurological: Negative for dizziness, tremors, syncope, weakness, light-headedness and headaches.   Hematological: Does not bruise/bleed easily.   Psychiatric/Behavioral: Negative.        Physical Exam     Recent Vitals 3/23/2020   Height -   Weight 193 lbs 6 oz   BSA (m2) 2.02 m2   /78   Pulse 77   Temp 97.7   Temp src -   SpO2 -   Some recent data might be hidden       Constitutional: Oriented to person, place, and time and appears well-developed.   HEENT:  Normocephalic and atraumatic.  Eyes: Conjunctivae and EOM are normal. Pupils are equal, round, and reactive to light. No discharge.  No scleral icterus. Nose normal. Mouth/Throat: Oropharynx is clear and moist. No oropharyngeal exudate.    NECK: Normal range of motion. Neck supple. No JVD present. No tracheal deviation present. No thyromegaly present.   CARDIOVASCULAR: Normal rate, regular rhythm and intact distal pulses.  Exam reveals no  gallop and no friction rub.  Systolic murmur present.  PULMONARY: Effort normal and breath sounds normal. No respiratory distress.No Wheezing or rales.  ABDOMEN: Soft. Bowel sounds are normal. No distension and no mass.  There is no tenderness. There is no rebound and no guarding. No HSM.  MUSCULOSKELETAL: Normal range of motion.  Has 1+ leg edema and no tenderness. Mild kyphosis, no tenderness.  Midline surgical incision intact over the left knee with a Mepilex dressing no significant effusion noted of the joint  He is compliant with his immobilizer  LYMPH NODES: Has no cervical, supraclavicular, axillary and groin adenopathy.   NEUROLOGICAL: Alert and oriented to person, place, and time. No cranial nerve deficit.  Normal muscle tone. Coordination normal.   GENITOURINARY: Deferred exam.  SKIN: Skin is warm and dry. No rash noted. No erythema. No pallor.   EXTREMITIES: No cyanosis, no clubbing, has 1+ leg edema. No Deformity.  PSYCHIATRIC: Normal mood, affect and behavior.      Lab Results     Recent Results (from the past 240 hour(s))   HM2(CBC w/o Differential)    Collection Time: 03/18/20  9:56 AM   Result Value Ref Range    WBC 10.0 4.0 - 11.0 thou/uL    RBC 2.04 (L) 4.40 - 6.20 mill/uL    Hemoglobin 6.6 (LL) 14.0 - 18.0 g/dL    Hematocrit 20.0 (L) 40.0 - 54.0 %    MCV 98 80 - 100 fL    MCH 32.4 27.0 - 34.0 pg    MCHC 33.0 32.0 - 36.0 g/dL    RDW 14.6 (H) 11.0 - 14.5 %    Platelets 490 (H) 140 - 440 thou/uL    MPV 9.2 8.5 - 12.5 fL   Comprehensive Metabolic Panel    Collection Time: 03/18/20  9:56 AM   Result Value Ref Range    Sodium 132 (L) 136 - 145 mmol/L    Potassium 3.7 3.5 - 5.0 mmol/L    Chloride 99 98 - 107 mmol/L    CO2 26 22 - 31 mmol/L    Anion Gap, Calculation 7 5 - 18 mmol/L    Glucose 107 70 - 125 mg/dL    BUN 14 8 - 28 mg/dL    Creatinine 0.97 0.70 - 1.30 mg/dL    GFR MDRD Af Amer >60 >60 mL/min/1.73m2    GFR MDRD Non Af Amer >60 >60 mL/min/1.73m2    Bilirubin, Total 0.3 0.0 - 1.0 mg/dL     Calcium 7.9 (L) 8.5 - 10.5 mg/dL    Protein, Total 5.4 (L) 6.0 - 8.0 g/dL    Albumin 2.4 (L) 3.5 - 5.0 g/dL    Alkaline Phosphatase 140 (H) 45 - 120 U/L    AST 33 0 - 40 U/L    ALT 25 0 - 45 U/L   Magnesium    Collection Time: 03/18/20  9:56 AM   Result Value Ref Range    Magnesium 1.7 (L) 1.8 - 2.6 mg/dL   POCT occult blood stool    Collection Time: 03/18/20  7:19 PM   Result Value Ref Range    POC Fecal Occult Bld Positive (!) Negative    Lot Number [id number]     Expiration Date 2/22     Diluent/Developer Lot Number [id number]     Diluent/Developer Expiration Date 2022/10     Pos Control Valid Control Valid Control    Neg Control Valid Control Valid Control   ECG 12 lead nursing unit performed    Collection Time: 03/18/20  7:20 PM   Result Value Ref Range    SYSTOLIC BLOOD PRESSURE 140 mmHg    DIASTOLIC BLOOD PRESSURE 67 mmHg    VENTRICULAR RATE 103 BPM    ATRIAL RATE 103 BPM    P-R INTERVAL 172 ms    QRS DURATION 80 ms    Q-T INTERVAL 346 ms    QTC CALCULATION (BEZET) 453 ms    P Axis 47 degrees    R AXIS 43 degrees    T AXIS 53 degrees    MUSE DIAGNOSIS       Sinus tachycardia  Low voltage QRS  Borderline ECG  When compared with ECG of 08-MAR-2020 21:54,  No significant change was found  Confirmed by SEE ED PROVIDER NOTE FOR, ECG INTERPRETATION ([id number]),  Africa Leger ([id number]) on 3/23/2020 8:59:04 AM     APTT(PTT)    Collection Time: 03/18/20  7:40 PM   Result Value Ref Range    PTT 26 24 - 37 seconds   INR    Collection Time: 03/18/20  7:40 PM   Result Value Ref Range    INR 1.12 (H) 0.90 - 1.10   Troponin I    Collection Time: 03/18/20  7:40 PM   Result Value Ref Range    Troponin I <0.01 0.00 - 0.29 ng/mL   Magnesium    Collection Time: 03/18/20  7:40 PM   Result Value Ref Range    Magnesium 1.7 (L) 1.8 - 2.6 mg/dL   Comprehensive Metabolic Panel    Collection Time: 03/18/20  7:40 PM   Result Value Ref Range    Sodium 130 (L) 136 - 145 mmol/L    Potassium 3.7 3.5 - 5.0 mmol/L    Chloride 98 98 - 107  mmol/L    CO2 27 22 - 31 mmol/L    Anion Gap, Calculation 5 5 - 18 mmol/L    Glucose 116 70 - 125 mg/dL    BUN 16 8 - 28 mg/dL    Creatinine 1.01 0.70 - 1.30 mg/dL    GFR MDRD Af Amer >60 >60 mL/min/1.73m2    GFR MDRD Non Af Amer >60 >60 mL/min/1.73m2    Bilirubin, Total 0.2 0.0 - 1.0 mg/dL    Calcium 7.8 (L) 8.5 - 10.5 mg/dL    Protein, Total 5.4 (L) 6.0 - 8.0 g/dL    Albumin 2.5 (L) 3.5 - 5.0 g/dL    Alkaline Phosphatase 140 (H) 45 - 120 U/L    AST 29 0 - 40 U/L    ALT 23 0 - 45 U/L   BNP(B-type Natriuretic Peptide)    Collection Time: 03/18/20  7:40 PM   Result Value Ref Range    BNP 57 0 - 93 pg/mL   HM1 (CBC with Diff)    Collection Time: 03/18/20  7:40 PM   Result Value Ref Range    WBC 12.3 (H) 4.0 - 11.0 thou/uL    RBC 1.99 (L) 4.40 - 6.20 mill/uL    Hemoglobin 6.3 (LL) 14.0 - 18.0 g/dL    Hematocrit 18.8 (L) 40.0 - 54.0 %    MCV 95 80 - 100 fL    MCH 31.7 27.0 - 34.0 pg    MCHC 33.5 32.0 - 36.0 g/dL    RDW 14.5 11.0 - 14.5 %    Platelets 511 (H) 140 - 440 thou/uL    MPV 8.9 8.5 - 12.5 fL    Neutrophils % 76 (H) 50 - 70 %    Lymphocytes % 13 (L) 20 - 40 %    Monocytes % 7 2 - 10 %    Eosinophils % 3 0 - 6 %    Basophils % 1 0 - 2 %    Neutrophils Absolute 9.4 (H) 2.0 - 7.7 thou/uL    Lymphocytes Absolute 1.6 0.8 - 4.4 thou/uL    Monocytes Absolute 0.8 0.0 - 0.9 thou/uL    Eosinophils Absolute 0.3 0.0 - 0.4 thou/uL    Basophils Absolute 0.1 0.0 - 0.2 thou/uL   Type and Screen    Collection Time: 03/18/20  7:40 PM   Result Value Ref Range    ABORh B POS     Antibody Screen Negative    Hemoglobin    Collection Time: 03/19/20  1:54 AM   Result Value Ref Range    Hemoglobin 8.3 (L) 14.0 - 18.0 g/dL   Basic Metabolic Panel    Collection Time: 03/19/20  6:15 AM   Result Value Ref Range    Sodium 133 (L) 136 - 145 mmol/L    Potassium 3.7 3.5 - 5.0 mmol/L    Chloride 101 98 - 107 mmol/L    CO2 27 22 - 31 mmol/L    Anion Gap, Calculation 5 5 - 18 mmol/L    Glucose 88 70 - 125 mg/dL    Calcium 7.7 (L) 8.5 - 10.5 mg/dL     BUN 12 8 - 28 mg/dL    Creatinine 1.00 0.70 - 1.30 mg/dL    GFR MDRD Af Amer >60 >60 mL/min/1.73m2    GFR MDRD Non Af Amer >60 >60 mL/min/1.73m2   HM2(CBC w/o Differential)    Collection Time: 03/19/20  6:15 AM   Result Value Ref Range    WBC 10.0 4.0 - 11.0 thou/uL    RBC 2.53 (L) 4.40 - 6.20 mill/uL    Hemoglobin 7.8 (L) 14.0 - 18.0 g/dL    Hematocrit 23.7 (L) 40.0 - 54.0 %    MCV 94 80 - 100 fL    MCH 30.8 27.0 - 34.0 pg    MCHC 32.9 32.0 - 36.0 g/dL    RDW 14.6 (H) 11.0 - 14.5 %    Platelets 474 (H) 140 - 440 thou/uL    MPV 8.9 8.5 - 12.5 fL   Hemoglobin    Collection Time: 03/19/20  1:14 PM   Result Value Ref Range    Hemoglobin 8.4 (L) 14.0 - 18.0 g/dL   Hemoglobin    Collection Time: 03/19/20  9:49 PM   Result Value Ref Range    Hemoglobin 8.8 (L) 14.0 - 18.0 g/dL   Crossmatch    Collection Time: 03/20/20 12:06 AM   Result Value Ref Range    Crossmatch Compatible     Unit Type B Pos     Unit Number [id number]     Status Transfused     Component Red Blood Cells     PRODUCT CODE S7143M41     Issue Date and Time [id number]     Blood Type 7300     CODING SYSTEM [id number]    Crossmatch    Collection Time: 03/20/20 12:06 AM   Result Value Ref Range    Crossmatch Compatible     Unit Type B Pos     Unit Number [id number]     Status Transfused     Component Red Blood Cells     PRODUCT CODE [id number]     Issue Date and Time [id number]     Blood Type 7300     CODING SYSTEM [id number]    POCT Glucose    Collection Time: 03/20/20  5:54 AM    Specimen: Capillary; Blood   Result Value Ref Range    Glucose 86 70 - 139 mg/dL   Hemoglobin    Collection Time: 03/20/20  6:35 AM   Result Value Ref Range    Hemoglobin 8.8 (L) 14.0 - 18.0 g/dL   H. pylori Antigen, Stool    Collection Time: 03/20/20  9:30 AM   Result Value Ref Range    H pylori Antigen Negative NEG            Imaging Results     Ct Abdomen Pelvis Without Oral Without Iv Contrast    Result Date: 3/8/2020  EXAM: CT ABDOMEN PELVIS WO ORAL WO IV CONTRAST  LOCATION: [facility] DATE/TIME: 3/8/2020 10:44 PM INDICATION: fever/weakness, decreased appetite today. COMPARISON: 02/24/2014 TECHNIQUE: CT scan of the abdomen and pelvis was performed without oral or IV contrast. Multiplanar reformats were obtained. Dose reduction techniques were used. CONTRAST: None. FINDINGS: LOWER CHEST: Mild basilar atelectasis. HEPATOBILIARY: Grossly stable on noncontrast imaging. PANCREAS: Normal. SPLEEN: Normal. ADRENAL GLANDS: Normal. KIDNEY/BLADDER: Bilateral renal cysts. Subcentimeter renal hypo and hyper densities small for characterization. BOWEL: Normal caliber. Haziness of small bowel mesentery and subcentimeter lymph nodes similar to previous. Diverticulosis. Appendix not seen. LYMPH NODES: Normal. VASCULATURE: Atherosclerotic vascular calcification. PELVIC ORGANS: Normal. MUSCULOSKELETAL: Degenerative change osseous structures.     1.  No inflammatory change, bowel obstruction or abscess. 2.  Haziness root of small bowel mesentery with subcentimeter mesenteric lymph nodes similar to prior. 3.  Diverticulosis. 4.  Renal cysts.    Xr Chest 1 View Portable    Result Date: 3/18/2020  EXAM: XR CHEST 1 VIEW PORTABLE LOCATION: [facility] DATE/TIME: 3/18/2020 7:59 PM INDICATION: Tachycardia. COMPARISON: 03/08/2020     Right PICC with tip in the SVC in good location. Chest otherwise negative.    Xr Knee Left 1 Or 2 Vws    Result Date: 3/8/2020  EXAM: XR KNEE LEFT 1 OR 2 VWS LOCATION: [facility] DATE/TIME: 3/8/2020 9:09 PM INDICATION: infection/redness post op january COMPARISON: 02/14/2020. [facility]     Postop left hip arthroplasty. Components appear well seated and well aligned. Tiny knee joint effusion similar to recent previous.    Xr Chest 1 View    Result Date: 3/8/2020  EXAM: XR CHEST 1 VIEW LOCATION: [facility] DATE/TIME: 3/8/2020 9:08 PM INDICATION: fever COMPARISON: 07/17/2018     Negative chest with no change.    Us Venous Leg Left    Result  Date: 3/8/2020  EXAM: US VENOUS LEG LEFT LOCATION: [facility] DATE/TIME: 3/8/2020 9:04 PM INDICATION: swelling, post op COMPARISON: None. TECHNIQUE: Venous Duplex ultrasound of the left lower extremity with and without compression, augmentation and duplex. Color flow and spectral Doppler with waveform analysis performed. FINDINGS: Exam includes the common femoral, femoral, popliteal, and contralateral common femoral veins as well as segmentally visualized deep calf veins and greater saphenous vein. LEFT: No deep vein thrombosis. No superficial thrombophlebitis. No popliteal cyst.     1.  No deep venous thrombosis in the left lower extremity. There are 2 fluid collections along the left knee anterior incision site measuring 6.3 x 4.0 x 1.3 cm and 5.9 x 2.2 x 0.4 cm.            LORE Orozco

## 2021-06-16 PROBLEM — C43.60: Status: ACTIVE | Noted: 2020-02-14

## 2021-06-16 PROBLEM — A41.9 SEPTICEMIA (H): Status: ACTIVE | Noted: 2020-03-09

## 2021-06-16 PROBLEM — I10 BENIGN ESSENTIAL HYPERTENSION: Status: ACTIVE | Noted: 2018-07-18

## 2021-06-16 PROBLEM — G47.00 INSOMNIA: Status: ACTIVE | Noted: 2020-03-18

## 2021-06-16 PROBLEM — E78.5 HYPERLIPIDEMIA: Status: ACTIVE | Noted: 2020-02-14

## 2021-06-16 PROBLEM — M85.80 OSTEOPENIA: Status: ACTIVE | Noted: 2020-02-14

## 2021-06-16 PROBLEM — M00.062 STAPHYLOCOCCAL ARTHRITIS OF LEFT KNEE (H): Status: ACTIVE | Noted: 2020-03-18

## 2021-06-16 PROBLEM — Z96.659 HISTORY OF TOTAL KNEE REPLACEMENT: Status: ACTIVE | Noted: 2019-10-21

## 2021-06-16 PROBLEM — R07.9 CHEST PAIN: Status: ACTIVE | Noted: 2018-07-17

## 2021-06-16 PROBLEM — K92.2 UGIB (UPPER GASTROINTESTINAL BLEED): Status: ACTIVE | Noted: 2020-03-18

## 2021-06-16 PROBLEM — M17.12 ARTHRITIS OF LEFT KNEE: Status: ACTIVE | Noted: 2020-02-14

## 2021-06-16 PROBLEM — K92.2 UPPER GI BLEED: Status: ACTIVE | Noted: 2020-03-18

## 2021-06-16 PROBLEM — R55 VASOVAGAL SYNCOPE: Status: ACTIVE | Noted: 2020-03-18

## 2021-06-20 NOTE — LETTER
Letter by Oriana Harrison MBBS at      Author: Oriana Harrison MBBS Service: -- Author Type: --    Filed:  Encounter Date: 3/17/2020 Status: (Other)         Patient: Jaiver Carrillo   MR Number: [id number]   YOB: 1934   Date of Visit: 3/17/2020       [facility] Admission note      Patient: Javier Carrillo  MRN: [id number]  Date of Service: 3/17/2020      [facility] [[id number]]  Reason for Visit     Chief Complaint   Patient presents with   ? H & P   Follow-up knee infection, recent hospital stay    Code Status     Full code    Assessment     -Acute septicemia with sepsis with suspicion for left knee septic joint with recent knee replacement status post I&D and poly-exchange on 3/10/2020  -History of traumatic rhabdomyolysis  -Acute kidney injury  -Mild hyponatremia.  -Elevated LFTs with transaminitis  -History of hypertension  -- hld  -Pain management  -Generalized debilitation discharge to the TCU for strengthening and rehab  -Blood loss anemia on discharge hemoglobin stable at 9.6.      Plan     Patient has been admitted to the TCU.  Based on orthopedic recommendation he will be weightbearing as tolerated.  Recommended to keep knee immobilizer on all times to the left lower extremity during ambulation for soft tissue healing  He will continue with ceftriaxone 2 g daily for the next 10 days.  He has a PICC line in place.  He is tolerating his antibiotics well  No recheck labs has been ordered for him plan is to recheck another set of labs on him closely  For DVT prophylaxis he will be on Xarelto 10 mg daily.  Close follow-up with orthopedics.  Close follow-up with infectious disease prior to discontinuation of antibiotics  He has an appointment for tomorrow.  DC his CQ 10 for polypharmacy concern.  Blood pressures are stable now that he is back on lisinopril with no hypotension episode noted.  Continue with his PT OT and rehab.  Family is concerned about his discharge orders  he has been given range of movement without immobilizer while in bed which his family is contesting so physical therapy will be contacting orthopedist to clarify order.  Recheck routine labs.  Follow-up with orthopedics closely as scheduled    History     Patient is a very pleasant 85 y.o. male who is admitted to TCU  Patient has a history of left knee arthroplasty and presented with suspicion for left knee septic joint.  He was admitted in the hospital and underwent I&D and poly-exchange on 3/10/2020.  Postoperatively MRSA swab was negative he was growing small amount of staph aureus in the synovial fluid infectious disease was consulted and he has been discharged on antibiotics.  He has been discharged on IV ceftriaxone 2 g IV daily for 10 days    He also developed traumatic rhabdomyolysis with acute renal insufficiency and was given IV hydration with improvement in renal function.  Postoperative course complicated by elevated LFTs this was felt to be systemic response to infection they recommended close monitoring.    Patient had a episode of hypotension associated with a large BM this was felt to be vasovagal in nature and recommended close monitoring.  He developed hyponatremia however sodiums were stable and were monitored.    Due to renal insufficiency lisinopril was held prior to discharge however medication was restarted with advised to monitor kidney functions  He denies any concerns      Past Medical History     Active Ambulatory (Non-Hospital) Problems    Diagnosis   ? Arthritis of left knee   ? Hyperlipidemia   ? Malignant melanoma of upper arm (H)   ? Osteopenia   ? History of total knee replacement   ? Benign essential hypertension   ? Chest pain   ? Gastroesophageal reflux disease without esophagitis   ? Dyspnea on exertion   ? Orthostatic hypotension     Past Medical History:   Diagnosis Date   ? Acute hyponatremia    ? GUSTAVO (acute kidney injury) (H)    ? GERD (gastroesophageal reflux disease)    ?  Hearing loss    ? HTN (hypertension)    ? Hypertriglyceridemia    ? Melanoma (H) 05/2015   ? Septicemia (H)    ? SVT (supraventricular tachycardia) (H) 1999   ? Traumatic rhabdomyolysis (H)        Past Social History     Reviewed, and he  reports that he has never smoked. He has never used smokeless tobacco. He reports that he does not drink alcohol or use drugs.    Family History     Reviewed, and family history includes Heart failure (age of onset: 80) in his sister; Pancreatic cancer in his brother.    Medication List   Post Discharge Medication Reconciliation Status: discharge medications reconciled and changed, per note/orders (see AVS)   Current Outpatient Medications on File Prior to Visit   Medication Sig Dispense Refill   ? acetaminophen (TYLENOL) 500 MG tablet Take 1,000 mg by mouth every 6 (six) hours as needed for pain.     ? aspirin-acetaminophen-caffeine (EXCEDRIN MIGRAINE) 250-250-65 mg per tablet Take 1 tablet by mouth daily.      ? calcium carbonate-vitamin D3 (OS-DEVON 250+ D) 250-125 mg-unit Tab per tablet Take 1 tablet by mouth daily.     ? calcium, as carbonate, (TUMS) 200 mg calcium (500 mg) chewable tablet Chew 2 tablets every 2 (two) hours as needed for heartburn.      ? cefTRIAXone 2 gram SolR Infuse 2,000 mg into a venous catheter daily.     ? lisinopril (PRINIVIL,ZESTRIL) 20 MG tablet TAKE 1 TABLET BY MOUTH EVERY DAY 90 tablet 0   ? melatonin 3 mg Tab tablet Take 3 mg by mouth at bedtime.     ? omeprazole (PRILOSEC) 20 MG capsule Take 1 capsule (20 mg total) by mouth daily before breakfast. 30 capsule 0   ? traMADoL (ULTRAM) 50 mg tablet Take 50 mg by mouth every 6 (six) hours as needed for pain.     ? ubidecarenone (COENZYME Q10) 100 mg Tab tablet Take 100 mg by mouth daily.        Current Facility-Administered Medications on File Prior to Visit   Medication Dose Route Frequency Provider Last Rate Last Dose   ? [DISCONTINUED] GENERIC EXTERNAL MEDICATION     GENERIC EXTERNAL DATA PROVIDER            Allergies     Allergies   Allergen Reactions   ? Levaquin [Levofloxacin]      Tendon pain       Review of Systems   A comprehensive review of 14 systems was done. Pertinent findings noted here and in history of present illness. All the rest negative.  Constitutional: Negative.  Negative for fever, chills, he has activity change, appetite change and fatigue.   HENT: Negative for congestion and facial swelling.    Eyes: Negative for photophobia, redness and visual disturbance.   Respiratory: Negative for cough and chest tightness.    Cardiovascular: Negative for chest pain, palpitations and has chronic  leg swelling.   Gastrointestinal: Negative for nausea, diarrhea, constipation, blood in stool and abdominal distention.   Genitourinary: Negative.    Musculoskeletal: Negative.  Compliant with his left knee immobilizer  Skin: Negative.    Neurological: Negative for dizziness, tremors, syncope, weakness, light-headedness and headaches.   Hematological: Does not bruise/bleed easily.   Psychiatric/Behavioral: Negative.        Physical Exam     Recent Vitals 3/16/2020   Height -   Weight 193 lbs   BSA (m2) 2.03 m2   /70   Pulse 93   Temp 97.6   Temp src -   SpO2 98   Some recent data might be hidden       Constitutional: Oriented to person, place, and time and appears well-developed.   HEENT:  Normocephalic and atraumatic.  Eyes: Conjunctivae and EOM are normal. Pupils are equal, round, and reactive to light. No discharge.  No scleral icterus. Nose normal. Mouth/Throat: Oropharynx is clear and moist. No oropharyngeal exudate.    NECK: Normal range of motion. Neck supple. No JVD present. No tracheal deviation present. No thyromegaly present.   CARDIOVASCULAR: Normal rate, regular rhythm and intact distal pulses.  Exam reveals no gallop and no friction rub.  Systolic murmur present.  PULMONARY: Effort normal and breath sounds normal. No respiratory distress.No Wheezing or rales.  ABDOMEN: Soft. Bowel sounds are  normal. No distension and no mass.  There is no tenderness. There is no rebound and no guarding. No HSM.  MUSCULOSKELETAL: Normal range of motion.  He has leg edema and no tenderness. Mild kyphosis, no tenderness.  Left knee has an intact surgical incision covered with Mepilex with a knee immobilizer noted in place  LYMPH NODES: Has no cervical, supraclavicular, axillary and groin adenopathy.   NEUROLOGICAL: Alert and oriented to person, place, and time. No cranial nerve deficit.  Normal muscle tone. Coordination normal.   GENITOURINARY: Deferred exam.  SKIN: Skin is warm and dry. No rash noted. No erythema. No pallor.   EXTREMITIES: No cyanosis, no clubbing, he has leg edema. No Deformity.  PSYCHIATRIC: Normal mood, affect and behavior.      Lab Results     Recent Results (from the past 240 hour(s))   CK Total    Collection Time: 03/08/20  7:41 PM   Result Value Ref Range    CK, Total 10,121 (HH) 30 - 190 U/L   Comprehensive Metabolic Panel    Collection Time: 03/08/20  7:41 PM   Result Value Ref Range    Sodium 129 (L) 136 - 145 mmol/L    Potassium 4.5 3.5 - 5.0 mmol/L    Chloride 96 (L) 98 - 107 mmol/L    CO2 22 22 - 31 mmol/L    Anion Gap, Calculation 11 5 - 18 mmol/L    Glucose 108 70 - 125 mg/dL    BUN 40 (H) 8 - 28 mg/dL    Creatinine 1.62 (H) 0.70 - 1.30 mg/dL    GFR MDRD Af Amer 49 (L) >60 mL/min/1.73m2    GFR MDRD Non Af Amer 41 (L) >60 mL/min/1.73m2    Bilirubin, Total 0.7 0.0 - 1.0 mg/dL    Calcium 9.1 8.5 - 10.5 mg/dL    Protein, Total 7.1 6.0 - 8.0 g/dL    Albumin 3.1 (L) 3.5 - 5.0 g/dL    Alkaline Phosphatase 131 (H) 45 - 120 U/L     (H) 0 - 40 U/L    ALT 96 (H) 0 - 45 U/L   Lactic Acid    Collection Time: 03/08/20  7:41 PM   Result Value Ref Range    Lactic Acid 1.7 0.5 - 2.2 mmol/L   Blood culture from PERIPHERAL SITE    Collection Time: 03/08/20  7:41 PM    Specimen: Vein, Peripheral; Blood   Result Value Ref Range    Anaerobic Blood Culture Bottle No Growth No Growth, No organisms seen,  bottle returned to instrument, Specimen not received, No Growth at 24 hours, No Growth at 48 hours, No Growth at 72 hours, No Growth at 96 hours, No Growth at 120 hours    Aerobic Blood Culture Bottle No Growth No Growth, No organisms seen, bottle returned to instrument, Specimen not received, No Growth at 24 hours, No Growth at 120 hours, No Growth at 48 hours, No Growth at 72 hours, No Growth at 96 hours   HM1 (CBC with Diff)    Collection Time: 03/08/20  7:41 PM   Result Value Ref Range    WBC 17.5 (H) 4.0 - 11.0 thou/uL    RBC 4.04 (L) 4.40 - 6.20 mill/uL    Hemoglobin 13.0 (L) 14.0 - 18.0 g/dL    Hematocrit 37.6 (L) 40.0 - 54.0 %    MCV 93 80 - 100 fL    MCH 32.2 27.0 - 34.0 pg    MCHC 34.6 32.0 - 36.0 g/dL    RDW 13.4 11.0 - 14.5 %    Platelets 285 140 - 440 thou/uL    MPV 10.1 8.5 - 12.5 fL    Neutrophils % 83 (H) 50 - 70 %    Lymphocytes % 9 (L) 20 - 40 %    Monocytes % 7 2 - 10 %    Eosinophils % 0 0 - 6 %    Basophils % 0 0 - 2 %    Neutrophils Absolute 14.5 (H) 2.0 - 7.7 thou/uL    Lymphocytes Absolute 1.6 0.8 - 4.4 thou/uL    Monocytes Absolute 1.2 (H) 0.0 - 0.9 thou/uL    Eosinophils Absolute 0.1 0.0 - 0.4 thou/uL    Basophils Absolute 0.0 0.0 - 0.2 thou/uL   Magnesium    Collection Time: 03/08/20  7:41 PM   Result Value Ref Range    Magnesium 2.2 1.8 - 2.6 mg/dL   Blood Culture from PERIPHERAL SITE (2nd One)    Collection Time: 03/08/20  8:25 PM    Specimen: Vein, Peripheral; Blood   Result Value Ref Range    Anaerobic Blood Culture Bottle No Growth No Growth, No organisms seen, bottle returned to instrument, Specimen not received, No Growth at 24 hours, No Growth at 48 hours, No Growth at 72 hours, No Growth at 96 hours, No Growth at 120 hours    Aerobic Blood Culture Bottle No Growth No Growth, No organisms seen, bottle returned to instrument, Specimen not received, No Growth at 24 hours, No Growth at 120 hours, No Growth at 48 hours, No Growth at 72 hours, No Growth at 96 hours   Wound Culture/Gram  Stain (aerobic)    Collection Time: 03/08/20  8:26 PM    Specimen: Knee, Left; Swab   Result Value Ref Range    Culture 1+ Staphylococcus aureus (!)     Gram Stain Result No polymorphonuclear leukocytes seen     Gram Stain Result No organisms seen        Susceptibility    Staphylococcus aureus - MARISABEL     Clindamycin <=0.5 Sensitive      Cefazolin <=2 Sensitive      Doxycycline <=0.5 Sensitive      Erythromycin <=0.5 Sensitive      Levofloxacin <=0.5 Sensitive      Oxacillin 0.5 Sensitive      Trimethoprim + Sulfamethoxazole <=1/19 Sensitive      Vancomycin 1 Sensitive    Influenza A/B Rapid Test    Collection Time: 03/08/20  8:26 PM    Specimen: Nasopharyngeal Swab; Nasopharyngeal (Inpt/ED) or Nasal Mucosa (Outpt)   Result Value Ref Range    Influenza  A, Rapid Antigen No Influenza A antigen detected No Influenza A antigen detected    Influenza B, Rapid Antigen No Influenza B antigen detected No Influenza B antigen detected   Urinalysis-UC if Indicated    Collection Time: 03/08/20  9:30 PM   Result Value Ref Range    Color, UA Yellow Colorless, Yellow, Straw, Light Yellow    Clarity, UA Clear Clear    Glucose, UA Negative Negative    Bilirubin, UA Negative Negative    Ketones, UA Negative Negative    Specific Gravity, UA 1.005 1.001 - 1.030    Blood, UA Large (!) Negative    pH, UA 5.0 4.5 - 8.0    Protein, UA Negative Negative mg/dL    Urobilinogen, UA <2.0 E.U./dL <2.0 E.U./dL, 2.0 E.U./dL    Nitrite, UA Negative Negative    Leukocytes, UA Negative Negative    Bacteria, UA None Seen None Seen hpf    RBC, UA 0-2 None Seen, 0-2 hpf    WBC, UA 0-5 None Seen, 0-5 hpf    Squam Epithel, UA 0-5 None Seen, 0-5 lpf    Mucus, UA Few (!) None Seen lpf   ECG 12 lead nursing unit performed    Collection Time: 03/08/20  9:54 PM   Result Value Ref Range    SYSTOLIC BLOOD PRESSURE 189 mmHg    DIASTOLIC BLOOD PRESSURE 85 mmHg    VENTRICULAR RATE 111 BPM    ATRIAL RATE 111 BPM    P-R INTERVAL 170 ms    QRS DURATION 64 ms    Q-T  INTERVAL 312 ms    QTC CALCULATION (BEZET) 424 ms    P Axis 56 degrees    R AXIS 34 degrees    T AXIS 53 degrees    MUSE DIAGNOSIS       Sinus tachycardia  Low voltage QRS  Cannot rule out Anterior infarct , age undetermined  Abnormal ECG  When compared with ECG of 14-FEB-2020 13:15,  No significant change was found  Confirmed by SEE ED PROVIDER NOTE FOR, ECG INTERPRETATION ([id number]),  ABDON MONTERO ([id number]) on 3/9/2020 1:13:21 AM              Imaging Results     Ct Abdomen Pelvis Without Oral Without Iv Contrast    Result Date: 3/8/2020  EXAM: CT ABDOMEN PELVIS WO ORAL WO IV CONTRAST LOCATION: [facility] DATE/TIME: 3/8/2020 10:44 PM INDICATION: fever/weakness, decreased appetite today. COMPARISON: 02/24/2014 TECHNIQUE: CT scan of the abdomen and pelvis was performed without oral or IV contrast. Multiplanar reformats were obtained. Dose reduction techniques were used. CONTRAST: None. FINDINGS: LOWER CHEST: Mild basilar atelectasis. HEPATOBILIARY: Grossly stable on noncontrast imaging. PANCREAS: Normal. SPLEEN: Normal. ADRENAL GLANDS: Normal. KIDNEY/BLADDER: Bilateral renal cysts. Subcentimeter renal hypo and hyper densities small for characterization. BOWEL: Normal caliber. Haziness of small bowel mesentery and subcentimeter lymph nodes similar to previous. Diverticulosis. Appendix not seen. LYMPH NODES: Normal. VASCULATURE: Atherosclerotic vascular calcification. PELVIC ORGANS: Normal. MUSCULOSKELETAL: Degenerative change osseous structures.     1.  No inflammatory change, bowel obstruction or abscess. 2.  Haziness root of small bowel mesentery with subcentimeter mesenteric lymph nodes similar to prior. 3.  Diverticulosis. 4.  Renal cysts.    Xr Knee Left 1 Or 2 Vws    Result Date: 3/8/2020  EXAM: XR KNEE LEFT 1 OR 2 VWS LOCATION: [facility] DATE/TIME: 3/8/2020 9:09 PM INDICATION: infection/redness post op january COMPARISON: 02/14/2020. [facility]     Postop left hip arthroplasty. Components  appear well seated and well aligned. Tiny knee joint effusion similar to recent previous.    Xr Chest 1 View    Result Date: 3/8/2020  EXAM: XR CHEST 1 VIEW LOCATION: [facility] DATE/TIME: 3/8/2020 9:08 PM INDICATION: fever COMPARISON: 07/17/2018     Negative chest with no change.    Us Venous Leg Left    Result Date: 3/8/2020  EXAM: US VENOUS LEG LEFT LOCATION: [facility] DATE/TIME: 3/8/2020 9:04 PM INDICATION: swelling, post op COMPARISON: None. TECHNIQUE: Venous Duplex ultrasound of the left lower extremity with and without compression, augmentation and duplex. Color flow and spectral Doppler with waveform analysis performed. FINDINGS: Exam includes the common femoral, femoral, popliteal, and contralateral common femoral veins as well as segmentally visualized deep calf veins and greater saphenous vein. LEFT: No deep vein thrombosis. No superficial thrombophlebitis. No popliteal cyst.     1.  No deep venous thrombosis in the left lower extremity. There are 2 fluid collections along the left knee anterior incision site measuring 6.3 x 4.0 x 1.3 cm and 5.9 x 2.2 x 0.4 cm.            LORE Orozco

## 2021-06-20 NOTE — LETTER
Letter by Ariella Thomson NP at      Author: Ariella Thomson NP Service: -- Author Type: --    Filed:  Encounter Date: 3/16/2020 Status: (Other)         Patient: Javier Carrillo   MR Number: [id number]   YOB: 1934   Date of Visit: 3/16/2020                 [facility]    DATE: 3/16/2020    NAME:  Javier Carrillo             :  1934  MRN: [id number]  CODE STATUS:  POLST AVAILABLE    VISIT TYPE: Problem Visit (hospital f/u)     FACILITY:  [facility] [[id number]]       CHIEF COMPLAIN/REASON FOR VISIT:    Chief Complaint   Patient presents with   ? Problem Visit     hospital f/u               HISTORY OF PRESENT ILLNESS: Javier Carrillo is a 85 y.o. male who was admitted to [facility] 3/9-3/13 for sepsis, Left knee septic joint. He underwent I&D exchange on 3/10. His cultures showed staph aureus, MSSA. He was on ancef and vanco then later ceftriaxone. He was recommended 2 weeks of IV antibiotics and then follow up with Id. He was also treated for rhabdo, GUSTAVO, hyponatremia. He had an episode of vasovagal syncope in hospital but work up stable. He was discharged to [facility] for further rehab. He has PMH of HTN.     Today Mr. Carrillo is seen for hospital follow up and admission to TCU visit. He is seen in his room with nursing present for part of visit today. He says his stools are very black and he is concerned about this. He is not having any abdominal pain or nausea but does have some upset stomach relating to his pain meds. He says the stool is just so black, not tarry like the black color of his tv. Reviewed his med list and he is not on iron supplement. He says he did not recall being on iron. He had a bm yesterday. His appetite is fair depending on the food. He is concerned about his stomach upset from the tramadol and thinks even tylenol might upset his stomach but not sure. He has been taking tums intermittently when he can get it. He has  not tried taking his pain meds with food. He does request a sleep aid tonight. He has an ortho appt tomorrow and Id on Wednesday. He denies any other medication concerns.     REVIEW OF SYSTEMS:  PROBLEMS AND REVIEW OF SYSTEMS:   Today on ROS:   Currently, no fever, chills, or rigors. Decreased vision and hearing. Denies any chest pain, headaches, palpitations, lightheadedness, dizziness, shortness of breath, or cough. Appetite is fair. Denies any difficulty with swallowing, nausea, or vomiting.  Denies any abdominal pain, diarrhea or constipation. Denies any urinary symptoms.  No active bleeding. No rash. Positive for weakness, black stools, upset stomach, right arm picc line, knee pain, knee incision, no further dizziness or syncope, leg swelling, insomnia      Allergies   Allergen Reactions   ? Levaquin [Levofloxacin]      Tendon pain     No current outpatient medications on file.     Past Medical History:    Past Medical History:   Diagnosis Date   ? Acute hyponatremia    ? GUSTAVO (acute kidney injury) (H)    ? GERD (gastroesophageal reflux disease)    ? Hearing loss    ? HTN (hypertension)    ? Hypertriglyceridemia    ? Melanoma (H) 05/2015    Rt Bicep   ? Septicemia (H)    ? SVT (supraventricular tachycardia) (H) 1999   ? Traumatic rhabdomyolysis (H)            PHYSICAL EXAMINATION  Vitals:    03/16/20 0700   BP: 168/70   Pulse: 93   Resp: 16   Temp: 97.6  F (36.4  C)   SpO2: 98%   Weight: 193 lb (87.5 kg)       Today on physical exam:     GENERAL: Awake, Alert, oriented x3, not in any form of acute distress, answers questions appropriately, follows simple commands, conversant  HEENT: Head is normocephalic with normal hair distribution. No evidence of trauma. Ears: No acute purulent discharge. Eyes: Conjunctivae pink with no scleral jaundice. Nose: Normal mucosa and septum. NECK: Supple with no cervical or supraclavicular lymphadenopathy. Trachea is midline. Decreased vision and hearing  CHEST: No tenderness or  deformity, no crepitus  LUNG: dim to auscultation with good chest expansion. There are no crackles or wheezes, normal AP diameter.  BACK: No kyphosis of the thoracic spine. Symmetric, no curvature, ROM normal, no CVA tenderness, no spinal tenderness   CVS: There is good S1  S2, regular rhythm, there are no murmurs, rubs, gallops, or heaves,  2+ pulses symmetric in all extremities.  ABDOMEN: Rounded and soft, nontender to palpation, non distended, no masses, no organomegaly, good bowel sounds, no rebound or guarding, no peritoneal signs.   EXTREMITIES: Left knee incision, immobilizer in place, 2+ edema, some numbness, pressure in lower leg area  SKIN: Warm and dry, no erythema noted.  Skin color, texture, no rashes or lesions.  NEUROLOGICAL: The patient is oriented to person, place and time.             LABS:   Recent Results (from the past 168 hour(s))   HM2(CBC w/o Differential)   Result Value Ref Range    WBC 10.0 4.0 - 11.0 thou/uL    RBC 2.04 (L) 4.40 - 6.20 mill/uL    Hemoglobin 6.6 (LL) 14.0 - 18.0 g/dL    Hematocrit 20.0 (L) 40.0 - 54.0 %    MCV 98 80 - 100 fL    MCH 32.4 27.0 - 34.0 pg    MCHC 33.0 32.0 - 36.0 g/dL    RDW 14.6 (H) 11.0 - 14.5 %    Platelets 490 (H) 140 - 440 thou/uL    MPV 9.2 8.5 - 12.5 fL   Comprehensive Metabolic Panel   Result Value Ref Range    Sodium 132 (L) 136 - 145 mmol/L    Potassium 3.7 3.5 - 5.0 mmol/L    Chloride 99 98 - 107 mmol/L    CO2 26 22 - 31 mmol/L    Anion Gap, Calculation 7 5 - 18 mmol/L    Glucose 107 70 - 125 mg/dL    BUN 14 8 - 28 mg/dL    Creatinine 0.97 0.70 - 1.30 mg/dL    GFR MDRD Af Amer >60 >60 mL/min/1.73m2    GFR MDRD Non Af Amer >60 >60 mL/min/1.73m2    Bilirubin, Total 0.3 0.0 - 1.0 mg/dL    Calcium 7.9 (L) 8.5 - 10.5 mg/dL    Protein, Total 5.4 (L) 6.0 - 8.0 g/dL    Albumin 2.4 (L) 3.5 - 5.0 g/dL    Alkaline Phosphatase 140 (H) 45 - 120 U/L    AST 33 0 - 40 U/L    ALT 25 0 - 45 U/L   Magnesium   Result Value Ref Range    Magnesium 1.7 (L) 1.8 - 2.6 mg/dL    POCT occult blood stool   Result Value Ref Range    POC Fecal Occult Bld Positive (!) Negative    Lot Number [id number]     Expiration Date 2/22     Diluent/Developer Lot Number [id number]     Diluent/Developer Expiration Date 2022/10     Pos Control Valid Control Valid Control    Neg Control Valid Control Valid Control   APTT(PTT)   Result Value Ref Range    PTT 26 24 - 37 seconds   INR   Result Value Ref Range    INR 1.12 (H) 0.90 - 1.10   Troponin I   Result Value Ref Range    Troponin I <0.01 0.00 - 0.29 ng/mL   Magnesium   Result Value Ref Range    Magnesium 1.7 (L) 1.8 - 2.6 mg/dL   Comprehensive Metabolic Panel   Result Value Ref Range    Sodium 130 (L) 136 - 145 mmol/L    Potassium 3.7 3.5 - 5.0 mmol/L    Chloride 98 98 - 107 mmol/L    CO2 27 22 - 31 mmol/L    Anion Gap, Calculation 5 5 - 18 mmol/L    Glucose 116 70 - 125 mg/dL    BUN 16 8 - 28 mg/dL    Creatinine 1.01 0.70 - 1.30 mg/dL    GFR MDRD Af Amer >60 >60 mL/min/1.73m2    GFR MDRD Non Af Amer >60 >60 mL/min/1.73m2    Bilirubin, Total 0.2 0.0 - 1.0 mg/dL    Calcium 7.8 (L) 8.5 - 10.5 mg/dL    Protein, Total 5.4 (L) 6.0 - 8.0 g/dL    Albumin 2.5 (L) 3.5 - 5.0 g/dL    Alkaline Phosphatase 140 (H) 45 - 120 U/L    AST 29 0 - 40 U/L    ALT 23 0 - 45 U/L   BNP(B-type Natriuretic Peptide)   Result Value Ref Range    BNP 57 0 - 93 pg/mL   HM1 (CBC with Diff)   Result Value Ref Range    WBC 12.3 (H) 4.0 - 11.0 thou/uL    RBC 1.99 (L) 4.40 - 6.20 mill/uL    Hemoglobin 6.3 (LL) 14.0 - 18.0 g/dL    Hematocrit 18.8 (L) 40.0 - 54.0 %    MCV 95 80 - 100 fL    MCH 31.7 27.0 - 34.0 pg    MCHC 33.5 32.0 - 36.0 g/dL    RDW 14.5 11.0 - 14.5 %    Platelets 511 (H) 140 - 440 thou/uL    MPV 8.9 8.5 - 12.5 fL    Neutrophils % 76 (H) 50 - 70 %    Lymphocytes % 13 (L) 20 - 40 %    Monocytes % 7 2 - 10 %    Eosinophils % 3 0 - 6 %    Basophils % 1 0 - 2 %    Neutrophils Absolute 9.4 (H) 2.0 - 7.7 thou/uL    Lymphocytes Absolute 1.6 0.8 - 4.4 thou/uL    Monocytes Absolute 0.8 0.0 -  0.9 thou/uL    Eosinophils Absolute 0.3 0.0 - 0.4 thou/uL    Basophils Absolute 0.1 0.0 - 0.2 thou/uL   Type and Screen   Result Value Ref Range    ABORh B POS     Antibody Screen Negative    Crossmatch   Result Value Ref Range    Crossmatch Compatible     Unit Type B Pos     Unit Number [id number]     Status Issued     Component Red Blood Cells     PRODUCT CODE G4996X96     Issue Date and Time [id number]     Blood Type 7300     CODING SYSTEM [id number]    Crossmatch   Result Value Ref Range    Crossmatch Compatible     Unit Type B Pos     Unit Number [id number]     Status Issued     Component Red Blood Cells     PRODUCT CODE W3590M04     Issue Date and Time [id number]     Blood Type 7300     CODING SYSTEM [id number]      Results for orders placed or performed during the hospital encounter of 02/14/20   Basic Metabolic Panel   Result Value Ref Range    Sodium 129 (L) 136 - 145 mmol/L    Potassium 4.2 3.5 - 5.0 mmol/L    Chloride 97 (L) 98 - 107 mmol/L    CO2 24 22 - 31 mmol/L    Anion Gap, Calculation 8 5 - 18 mmol/L    Glucose 123 70 - 125 mg/dL    Calcium 8.7 8.5 - 10.5 mg/dL    BUN 30 (H) 8 - 28 mg/dL    Creatinine 1.36 (H) 0.70 - 1.30 mg/dL    GFR MDRD Af Amer 60 (L) >60 mL/min/1.73m2    GFR MDRD Non Af Amer 50 (L) >60 mL/min/1.73m2         Lab Results   Component Value Date    WBC 12.3 (H) 03/18/2020    HGB 6.3 (LL) 03/18/2020    HCT 18.8 (L) 03/18/2020    MCV 95 03/18/2020     (H) 03/18/2020       No results found for: [id number]  No results found for: HGBA1C  Lab Results   Component Value Date    INR 1.12 (H) 03/18/2020    INR 1.02 07/17/2018    INR 1.48 (H) 07/21/2012     Vitamin D, Total (25-Hydroxy)   Date Value Ref Range Status   08/31/2018 39.7 30.0 - 80.0 ng/mL Final     Lab Results   Component Value Date    TSH 1.53 07/18/2018           ASSESSMENT/PLAN:    Left knee septic joint, Staph aureus MSSA: Incision c/d/i, 2+ edema, immobilizer in place to be on at all times. Pain controlled on  current regimen. IV antibiotics ceftriaxone x 2 weeks, f/u with ID on 3/18 for further management. F/u with ortho on 3/17. PT, OT following. Will add tylenol prn to use first and tramadol 2nd line. Give with food and added tums, omeprazole to help with stomach upset.   ?GI bleed: reporting black stools. No iron supplement, no h/o gi bleeding. Omeprazole prn will change to daily scheduled. Monitor hg. Occult stool x 3 notify if positive. Discussed with  Him potential cause of black stools and monitoring needed. Discussed to notify immediately if develop dizziness, syncope, dark and tarry stools, abdominal pain, nausea and vomiting. Discussed with nursing to monitor as well and notify if occult stool positive. Hg 10-9 in hospital. No anticoagulants noted.   Insomnia: Add melatonin at bedtime.   HTN: SBP 160s: Controlled on current regimen.   GERD: reports gi upset with tramadol, encouraged to take with food. Ordered tylenol prn. Changed omeprazole to daily scheduled, tums prn.   Vasovagal syncope: episode in hospital felt to be from straining. Monitor for recurrence.     Electronically signed by: Ariella Thomson NP    Total floor/unit time spent 40 min with >50% time spent on counseling and coordination of care. Counseling was done regarding gi bleeding, pain management. Coordinated with nursing for monitoring and management of potential gi bleeding, pain management, ortho and ID follow ups.

## 2021-06-20 NOTE — LETTER
Letter by Oriana Harrison MBBS at      Author: Oriana Harrison MBBS Service: -- Author Type: --    Filed:  Encounter Date: 3/24/2020 Status: (Other)         Patient: Javier Carrillo   MR Number: [id number]   YOB: 1934   Date of Visit: 3/24/2020       [facility] Admission note      Patient: Javier Carrillo  MRN: [id number]  Date of Service: 3/24/2020      [facility] [[id number]]  Reason for Visit     Chief Complaint   Patient presents with   ? 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  Recheck LFTs in the hospital were normal  - Pain management.  -Generalized weakness    Plan     Patient has been readmitted to the TCU.  He admitted to the hospital with severe anemia concerning for GI bleeding with concerns of melena.  GI saw him and he underwent an EGD.  He was noticed to have a duodenal ulcer not actively bleeding.  Discharge on Protonix 40 mg twice daily  Advised not to use any NSAID products  Advised not to use any aspirin.  He was given 2 units of packed RBC transfusion.  Hemoglobin at discharge was improved at 8.4.  Due to his underlying history of pyogenic arthritis of his knee he continues on ceftriaxone 2 g daily  Continue with his weekly labs  Outpatient follow-up with orthopedics and infectious disease  Based on infectious disease recommendations he will continue on IV antibiotics till 4/21/2020  Due to elevated systolic blood pressure he is on a higher dose of amlodipine  Monitor blood pressure  trends  In light of the recent coronavirus pandemic to minimize nursing contact time with him medication review was done and he has been taken off vitamin D supplementation;   Also continue to hold his CQ 10  Continue with his PT OT and rehab  Follow-up labs reviewed and his H. pylori antigen testing done in the hospital was negative retest has been ordered for today    History     Patient is a very pleasant 85 y.o. male who is REadmitted to TCU  Patient presented to the hospital with acute anemia with melena.  He was admitted work-up revealed a hemoglobin of 6.6, patient was administered 2 units of packed RBCs with improvement in hemoglobin.  Patient was admitted with upper GI bleed concerns and underwent EGD he was found to have a duodenal ulcer which was not actively bleeding.  He has been placed on Protonix twice daily advised not to take any aspirin or NSAID products.  H. pylori is pending.  He is not on any other blood thinners other than Excedrin with aspirin    He has underlying History of pyogenic arthritis of his knee.  He continues with his IV ceftriaxone 2 g IV daily.  He has a PICC line and he will follow-up with infectious disease and orthopedics.  Currently based on orthopedic recommendation he will continue on his IV antibiotics till 4/21/2020  He is tolerating them well    He had some elevated blood pressures with underlying history of hypertension.  Blood pressures show systolic greater than 170 has been discharged on amlodipine    Pain management reviewed with him he reports his pain is stable he is compliant with his immobilizer    Past Medical History     Active Ambulatory (Non-Hospital) Problems    Diagnosis   ? Pyogenic arthritis of left knee joint, due to unspecified organism (H)   ? Anemia due to blood loss, acute   ? Essential hypertension, benign   ? Upper GI bleed   ? UGIB (upper gastrointestinal bleed)   ? Staphylococcal arthritis of left knee (H)   ? Insomnia   ? Vasovagal syncope   ?  Septicemia (H)   ? Arthritis of left knee   ? Hyperlipidemia   ? Malignant melanoma of upper arm (H)   ? Osteopenia   ? History of total knee replacement   ? Benign essential hypertension   ? Chest pain   ? Gastroesophageal reflux disease without esophagitis   ? Dyspnea on exertion   ? Orthostatic hypotension     Past Medical History:   Diagnosis Date   ? Acute hyponatremia    ? GUSTAVO (acute kidney injury) (H)    ? Chronic kidney disease    ? GERD (gastroesophageal reflux disease)    ? Hearing loss    ? History of transfusion    ? HTN (hypertension)    ? Hypertriglyceridemia    ? Melanoma (H) 05/2015   ? Septicemia (H)    ? SVT (supraventricular tachycardia) (H) 1999   ? Traumatic rhabdomyolysis (H)        Past Social History     Reviewed, and he  reports that he has never smoked. He has never used smokeless tobacco. He reports that he does not drink alcohol or use drugs.    Family History     Reviewed, and family history includes Heart failure (age of onset: 80) in his sister; Pancreatic cancer in his brother.    Medication List   Post Discharge Medication Reconciliation Status: discharge medications reconciled and changed, per note/orders (see AVS)   Current Outpatient Medications on File Prior to Visit   Medication Sig Dispense Refill   ? acetaminophen (TYLENOL) 500 MG tablet Take 1,000 mg by mouth every 6 (six) hours as needed for pain.     ? amLODIPine (NORVASC) 5 MG tablet Take 1 tablet (5 mg total) by mouth daily. 30 tablet 0   ? calcium carbonate-vitamin D3 (OS-DEVON 250+ D) 250-125 mg-unit Tab per tablet Take 1 tablet by mouth daily.     ? calcium, as carbonate, (TUMS) 200 mg calcium (500 mg) chewable tablet Chew 2 tablets every 2 (two) hours as needed for heartburn.      ? cefTRIAXone 2 gram SolR Infuse 2,000 mg into a venous catheter daily.     ? lisinopril (PRINIVIL,ZESTRIL) 20 MG tablet TAKE 1 TABLET BY MOUTH EVERY DAY 90 tablet 0   ? melatonin 3 mg Tab tablet Take 3 mg by mouth at bedtime.     ? omeprazole  (PRILOSEC) 20 MG capsule Take 1 capsule (20 mg total) by mouth daily before breakfast. 30 capsule 0   ? pantoprazole (PROTONIX) 40 MG tablet Take 1 tablet (40 mg total) by mouth 2 (two) times a day before meals. 60 tablet 1   ? traMADoL (ULTRAM) 50 mg tablet Take 1 tablet (50 mg total) by mouth every 6 (six) hours as needed for pain. 12 tablet 0     No current facility-administered medications on file prior to visit.        Allergies     Allergies   Allergen Reactions   ? Levaquin [Levofloxacin]      Tendon pain       Review of Systems   A comprehensive review of 14 systems was done. Pertinent findings noted here and in history of present illness. All the rest negative.  Constitutional: Negative.  Negative for fever, chills, has activity change, appetite change and fatigue.   HENT: Negative for congestion and facial swelling.    Eyes: Negative for photophobia, redness and visual disturbance.   Respiratory: Negative for cough and chest tightness.    Cardiovascular: Negative for chest pain, palpitations and leg swelling.   Gastrointestinal: Negative for nausea, diarrhea, constipation, blood in stool and abdominal distention.   Genitourinary: Negative.    Musculoskeletal: Negative.  Left knee incision is intact he is reporting some intermittent swelling  Skin: Negative.    Neurological: Negative for dizziness, tremors, syncope, weakness, light-headedness and headaches.   Hematological: Does not bruise/bleed easily.   Psychiatric/Behavioral: Negative.        Physical Exam     Recent Vitals 3/23/2020   Height -   Weight 193 lbs 6 oz   BSA (m2) 2.02 m2   /78   Pulse 77   Temp 97.7   Temp src -   SpO2 -   Some recent data might be hidden       Constitutional: Oriented to person, place, and time and appears well-developed.   HEENT:  Normocephalic and atraumatic.  Eyes: Conjunctivae and EOM are normal. Pupils are equal, round, and reactive to light. No discharge.  No scleral icterus. Nose normal. Mouth/Throat:  Oropharynx is clear and moist. No oropharyngeal exudate.    NECK: Normal range of motion. Neck supple. No JVD present. No tracheal deviation present. No thyromegaly present.   CARDIOVASCULAR: Normal rate, regular rhythm and intact distal pulses.  Exam reveals no gallop and no friction rub.  Systolic murmur present.  PULMONARY: Effort normal and breath sounds normal. No respiratory distress.No Wheezing or rales.  ABDOMEN: Soft. Bowel sounds are normal. No distension and no mass.  There is no tenderness. There is no rebound and no guarding. No HSM.  MUSCULOSKELETAL: Normal range of motion.  Has 1+ leg edema and no tenderness. Mild kyphosis, no tenderness.  Midline surgical incision intact over the left knee with a Mepilex dressing no significant effusion noted of the joint  He is compliant with his immobilizer  LYMPH NODES: Has no cervical, supraclavicular, axillary and groin adenopathy.   NEUROLOGICAL: Alert and oriented to person, place, and time. No cranial nerve deficit.  Normal muscle tone. Coordination normal.   GENITOURINARY: Deferred exam.  SKIN: Skin is warm and dry. No rash noted. No erythema. No pallor.   EXTREMITIES: No cyanosis, no clubbing, has 1+ leg edema. No Deformity.  PSYCHIATRIC: Normal mood, affect and behavior.      Lab Results     Recent Results (from the past 240 hour(s))   HM2(CBC w/o Differential)    Collection Time: 03/18/20  9:56 AM   Result Value Ref Range    WBC 10.0 4.0 - 11.0 thou/uL    RBC 2.04 (L) 4.40 - 6.20 mill/uL    Hemoglobin 6.6 (LL) 14.0 - 18.0 g/dL    Hematocrit 20.0 (L) 40.0 - 54.0 %    MCV 98 80 - 100 fL    MCH 32.4 27.0 - 34.0 pg    MCHC 33.0 32.0 - 36.0 g/dL    RDW 14.6 (H) 11.0 - 14.5 %    Platelets 490 (H) 140 - 440 thou/uL    MPV 9.2 8.5 - 12.5 fL   Comprehensive Metabolic Panel    Collection Time: 03/18/20  9:56 AM   Result Value Ref Range    Sodium 132 (L) 136 - 145 mmol/L    Potassium 3.7 3.5 - 5.0 mmol/L    Chloride 99 98 - 107 mmol/L    CO2 26 22 - 31 mmol/L     Anion Gap, Calculation 7 5 - 18 mmol/L    Glucose 107 70 - 125 mg/dL    BUN 14 8 - 28 mg/dL    Creatinine 0.97 0.70 - 1.30 mg/dL    GFR MDRD Af Amer >60 >60 mL/min/1.73m2    GFR MDRD Non Af Amer >60 >60 mL/min/1.73m2    Bilirubin, Total 0.3 0.0 - 1.0 mg/dL    Calcium 7.9 (L) 8.5 - 10.5 mg/dL    Protein, Total 5.4 (L) 6.0 - 8.0 g/dL    Albumin 2.4 (L) 3.5 - 5.0 g/dL    Alkaline Phosphatase 140 (H) 45 - 120 U/L    AST 33 0 - 40 U/L    ALT 25 0 - 45 U/L   Magnesium    Collection Time: 03/18/20  9:56 AM   Result Value Ref Range    Magnesium 1.7 (L) 1.8 - 2.6 mg/dL   POCT occult blood stool    Collection Time: 03/18/20  7:19 PM   Result Value Ref Range    POC Fecal Occult Bld Positive (!) Negative    Lot Number [id number]     Expiration Date 2/22     Diluent/Developer Lot Number [id number]     Diluent/Developer Expiration Date 2022/10     Pos Control Valid Control Valid Control    Neg Control Valid Control Valid Control   ECG 12 lead nursing unit performed    Collection Time: 03/18/20  7:20 PM   Result Value Ref Range    SYSTOLIC BLOOD PRESSURE 140 mmHg    DIASTOLIC BLOOD PRESSURE 67 mmHg    VENTRICULAR RATE 103 BPM    ATRIAL RATE 103 BPM    P-R INTERVAL 172 ms    QRS DURATION 80 ms    Q-T INTERVAL 346 ms    QTC CALCULATION (BEZET) 453 ms    P Axis 47 degrees    R AXIS 43 degrees    T AXIS 53 degrees    MUSE DIAGNOSIS       Sinus tachycardia  Low voltage QRS  Borderline ECG  When compared with ECG of 08-MAR-2020 21:54,  No significant change was found  Confirmed by SEE ED PROVIDER NOTE FOR, ECG INTERPRETATION ([id number]),  Africa Leger ([id number]) on 3/23/2020 8:59:04 AM     APTT(PTT)    Collection Time: 03/18/20  7:40 PM   Result Value Ref Range    PTT 26 24 - 37 seconds   INR    Collection Time: 03/18/20  7:40 PM   Result Value Ref Range    INR 1.12 (H) 0.90 - 1.10   Troponin I    Collection Time: 03/18/20  7:40 PM   Result Value Ref Range    Troponin I <0.01 0.00 - 0.29 ng/mL   Magnesium    Collection Time: 03/18/20   7:40 PM   Result Value Ref Range    Magnesium 1.7 (L) 1.8 - 2.6 mg/dL   Comprehensive Metabolic Panel    Collection Time: 03/18/20  7:40 PM   Result Value Ref Range    Sodium 130 (L) 136 - 145 mmol/L    Potassium 3.7 3.5 - 5.0 mmol/L    Chloride 98 98 - 107 mmol/L    CO2 27 22 - 31 mmol/L    Anion Gap, Calculation 5 5 - 18 mmol/L    Glucose 116 70 - 125 mg/dL    BUN 16 8 - 28 mg/dL    Creatinine 1.01 0.70 - 1.30 mg/dL    GFR MDRD Af Amer >60 >60 mL/min/1.73m2    GFR MDRD Non Af Amer >60 >60 mL/min/1.73m2    Bilirubin, Total 0.2 0.0 - 1.0 mg/dL    Calcium 7.8 (L) 8.5 - 10.5 mg/dL    Protein, Total 5.4 (L) 6.0 - 8.0 g/dL    Albumin 2.5 (L) 3.5 - 5.0 g/dL    Alkaline Phosphatase 140 (H) 45 - 120 U/L    AST 29 0 - 40 U/L    ALT 23 0 - 45 U/L   BNP(B-type Natriuretic Peptide)    Collection Time: 03/18/20  7:40 PM   Result Value Ref Range    BNP 57 0 - 93 pg/mL   HM1 (CBC with Diff)    Collection Time: 03/18/20  7:40 PM   Result Value Ref Range    WBC 12.3 (H) 4.0 - 11.0 thou/uL    RBC 1.99 (L) 4.40 - 6.20 mill/uL    Hemoglobin 6.3 (LL) 14.0 - 18.0 g/dL    Hematocrit 18.8 (L) 40.0 - 54.0 %    MCV 95 80 - 100 fL    MCH 31.7 27.0 - 34.0 pg    MCHC 33.5 32.0 - 36.0 g/dL    RDW 14.5 11.0 - 14.5 %    Platelets 511 (H) 140 - 440 thou/uL    MPV 8.9 8.5 - 12.5 fL    Neutrophils % 76 (H) 50 - 70 %    Lymphocytes % 13 (L) 20 - 40 %    Monocytes % 7 2 - 10 %    Eosinophils % 3 0 - 6 %    Basophils % 1 0 - 2 %    Neutrophils Absolute 9.4 (H) 2.0 - 7.7 thou/uL    Lymphocytes Absolute 1.6 0.8 - 4.4 thou/uL    Monocytes Absolute 0.8 0.0 - 0.9 thou/uL    Eosinophils Absolute 0.3 0.0 - 0.4 thou/uL    Basophils Absolute 0.1 0.0 - 0.2 thou/uL   Type and Screen    Collection Time: 03/18/20  7:40 PM   Result Value Ref Range    ABORh B POS     Antibody Screen Negative    Hemoglobin    Collection Time: 03/19/20  1:54 AM   Result Value Ref Range    Hemoglobin 8.3 (L) 14.0 - 18.0 g/dL   Basic Metabolic Panel    Collection Time: 03/19/20  6:15 AM    Result Value Ref Range    Sodium 133 (L) 136 - 145 mmol/L    Potassium 3.7 3.5 - 5.0 mmol/L    Chloride 101 98 - 107 mmol/L    CO2 27 22 - 31 mmol/L    Anion Gap, Calculation 5 5 - 18 mmol/L    Glucose 88 70 - 125 mg/dL    Calcium 7.7 (L) 8.5 - 10.5 mg/dL    BUN 12 8 - 28 mg/dL    Creatinine 1.00 0.70 - 1.30 mg/dL    GFR MDRD Af Amer >60 >60 mL/min/1.73m2    GFR MDRD Non Af Amer >60 >60 mL/min/1.73m2   HM2(CBC w/o Differential)    Collection Time: 03/19/20  6:15 AM   Result Value Ref Range    WBC 10.0 4.0 - 11.0 thou/uL    RBC 2.53 (L) 4.40 - 6.20 mill/uL    Hemoglobin 7.8 (L) 14.0 - 18.0 g/dL    Hematocrit 23.7 (L) 40.0 - 54.0 %    MCV 94 80 - 100 fL    MCH 30.8 27.0 - 34.0 pg    MCHC 32.9 32.0 - 36.0 g/dL    RDW 14.6 (H) 11.0 - 14.5 %    Platelets 474 (H) 140 - 440 thou/uL    MPV 8.9 8.5 - 12.5 fL   Hemoglobin    Collection Time: 03/19/20  1:14 PM   Result Value Ref Range    Hemoglobin 8.4 (L) 14.0 - 18.0 g/dL   Hemoglobin    Collection Time: 03/19/20  9:49 PM   Result Value Ref Range    Hemoglobin 8.8 (L) 14.0 - 18.0 g/dL   Crossmatch    Collection Time: 03/20/20 12:06 AM   Result Value Ref Range    Crossmatch Compatible     Unit Type B Pos     Unit Number [id number]     Status Transfused     Component Red Blood Cells     PRODUCT CODE S5253R44     Issue Date and Time [id number]     Blood Type 7300     CODING SYSTEM [id number]    Crossmatch    Collection Time: 03/20/20 12:06 AM   Result Value Ref Range    Crossmatch Compatible     Unit Type B Pos     Unit Number [id number]     Status Transfused     Component Red Blood Cells     PRODUCT CODE J5554C85     Issue Date and Time [id number]     Blood Type 7300     CODING SYSTEM [id number]    POCT Glucose    Collection Time: 03/20/20  5:54 AM    Specimen: Capillary; Blood   Result Value Ref Range    Glucose 86 70 - 139 mg/dL   Hemoglobin    Collection Time: 03/20/20  6:35 AM   Result Value Ref Range    Hemoglobin 8.8 (L) 14.0 - 18.0 g/dL   H. pylori Antigen,  Stool    Collection Time: 03/20/20  9:30 AM   Result Value Ref Range    H pylori Antigen Negative NEG            Imaging Results     Ct Abdomen Pelvis Without Oral Without Iv Contrast    Result Date: 3/8/2020  EXAM: CT ABDOMEN PELVIS WO ORAL WO IV CONTRAST LOCATION: [facility] DATE/TIME: 3/8/2020 10:44 PM INDICATION: fever/weakness, decreased appetite today. COMPARISON: 02/24/2014 TECHNIQUE: CT scan of the abdomen and pelvis was performed without oral or IV contrast. Multiplanar reformats were obtained. Dose reduction techniques were used. CONTRAST: None. FINDINGS: LOWER CHEST: Mild basilar atelectasis. HEPATOBILIARY: Grossly stable on noncontrast imaging. PANCREAS: Normal. SPLEEN: Normal. ADRENAL GLANDS: Normal. KIDNEY/BLADDER: Bilateral renal cysts. Subcentimeter renal hypo and hyper densities small for characterization. BOWEL: Normal caliber. Haziness of small bowel mesentery and subcentimeter lymph nodes similar to previous. Diverticulosis. Appendix not seen. LYMPH NODES: Normal. VASCULATURE: Atherosclerotic vascular calcification. PELVIC ORGANS: Normal. MUSCULOSKELETAL: Degenerative change osseous structures.     1.  No inflammatory change, bowel obstruction or abscess. 2.  Haziness root of small bowel mesentery with subcentimeter mesenteric lymph nodes similar to prior. 3.  Diverticulosis. 4.  Renal cysts.    Xr Chest 1 View Portable    Result Date: 3/18/2020  EXAM: XR CHEST 1 VIEW PORTABLE LOCATION: [facility] DATE/TIME: 3/18/2020 7:59 PM INDICATION: Tachycardia. COMPARISON: 03/08/2020     Right PICC with tip in the SVC in good location. Chest otherwise negative.    Xr Knee Left 1 Or 2 Vws    Result Date: 3/8/2020  EXAM: XR KNEE LEFT 1 OR 2 VWS LOCATION: [facility] DATE/TIME: 3/8/2020 9:09 PM INDICATION: infection/redness post op january COMPARISON: 02/14/2020. [facility]     Postop left hip arthroplasty. Components appear well seated and well aligned. Tiny knee joint effusion  similar to recent previous.    Xr Chest 1 View    Result Date: 3/8/2020  EXAM: XR CHEST 1 VIEW LOCATION: [facility] DATE/TIME: 3/8/2020 9:08 PM INDICATION: fever COMPARISON: 07/17/2018     Negative chest with no change.    Us Venous Leg Left    Result Date: 3/8/2020  EXAM: US VENOUS LEG LEFT LOCATION: [facility] DATE/TIME: 3/8/2020 9:04 PM INDICATION: swelling, post op COMPARISON: None. TECHNIQUE: Venous Duplex ultrasound of the left lower extremity with and without compression, augmentation and duplex. Color flow and spectral Doppler with waveform analysis performed. FINDINGS: Exam includes the common femoral, femoral, popliteal, and contralateral common femoral veins as well as segmentally visualized deep calf veins and greater saphenous vein. LEFT: No deep vein thrombosis. No superficial thrombophlebitis. No popliteal cyst.     1.  No deep venous thrombosis in the left lower extremity. There are 2 fluid collections along the left knee anterior incision site measuring 6.3 x 4.0 x 1.3 cm and 5.9 x 2.2 x 0.4 cm.            LORE Orozco

## 2021-06-20 NOTE — LETTER
Letter by Theresa Fernandez CNP at      Author: Theresa Fernandez CNP Service: -- Author Type: --    Filed:  Encounter Date: 3/23/2020 Status: (Other)         Patient: Javier Carrillo   MR Number: [id number]   YOB: 1934   Date of Visit: 3/23/2020       [facility]      NAME:  Javier Carrillo             :  1934    MRN: [id number]    CODE STATUS:  FULL CODE    FACILITY: [facility] [[id number]]    CHIEF COMPLAIN/REASON FOR VISIT:  Chief Complaint   Patient presents with   ? Review Of Multiple Medical Conditions     see HPI       HISTORY OF PRESENT ILLNESS: Javier Carrillo is a 85 y.o. male being seen today to initiate care after return from acute care. Orders and assessment completed. Pt originally in TCU S/P septic left knee requiring IVAB. Unfortunately he developed loose tarry stools and his HBG dropped. Sent back to acute care and EGD revealed duodenal ulcers. He received blood transfusion, stabilized and is now back to the TCU. He does have a left sided  velcro brace to leg. WBAT and a right sided PICC line for ABX therapies. Reports pain stable. We reviewed TCU routines as well as his care and med review today.     Allergies   Allergen Reactions   ? Levaquin [Levofloxacin]      Tendon pain   :     Current Outpatient Medications   Medication Sig   ? acetaminophen (TYLENOL) 500 MG tablet Take 1,000 mg by mouth every 6 (six) hours as needed for pain.   ? amLODIPine (NORVASC) 5 MG tablet Take 1 tablet (5 mg total) by mouth daily.   ? calcium carbonate-vitamin D3 (OS-DEVON 250+ D) 250-125 mg-unit Tab per tablet Take 1 tablet by mouth daily.   ? calcium, as carbonate, (TUMS) 200 mg calcium (500 mg) chewable tablet Chew 2 tablets every 2 (two) hours as needed for heartburn.    ? cefTRIAXone 2 gram SolR Infuse 2,000 mg into a venous catheter daily.   ? lisinopril (PRINIVIL,ZESTRIL) 20 MG tablet TAKE 1 TABLET BY MOUTH EVERY DAY   ? melatonin 3 mg Tab tablet Take 3  mg by mouth at bedtime.   ? omeprazole (PRILOSEC) 20 MG capsule Take 1 capsule (20 mg total) by mouth daily before breakfast.   ? pantoprazole (PROTONIX) 40 MG tablet Take 1 tablet (40 mg total) by mouth 2 (two) times a day before meals.   ? traMADoL (ULTRAM) 50 mg tablet Take 1 tablet (50 mg total) by mouth every 6 (six) hours as needed for pain.         REVIEW OF SYSTEMS:    Currently, no fever, chills, or rigors. Does not have any visual or hearing problems. Denies any chest pain, headaches, palpitations, lightheadedness, dizziness, shortness of breath, or cough. Appetite is good. Denies any GERD symptoms. Denies any difficulty with swallowing, nausea, or vomiting.  Denies any abdominal pain, diarrhea or constipation. Denies any urinary symptoms. No insomnia. No active bleeding. No rash.       PHYSICAL EXAMINATION:  Vitals:    03/23/20 1912   BP: 160/78   Pulse: 77   Temp: 97.7  F (36.5  C)   Weight: 193 lb 6.4 oz (87.7 kg)         GENERAL: Awake, Alert, oriented x3, not in any form of acute distress, answers questions appropriately, follows simple commands, conversant  HEENT: Head is normocephalic with normal hair distribution. No evidence of trauma. Ears: No acute purulent discharge. Eyes: Conjunctivae pink with no scleral jaundice. Nose: Normal mucosa and septum. NECK: Supple with no cervical or supraclavicular lymphadenopathy. Trachea is midline.   CHEST: No tenderness or deformity, no crepitus  LUNG: Clear to auscultation with good chest expansion. There are no crackles or wheezes, normal AP diameter.  BACK: No kyphosis of the thoracic spine. Symmetric, no curvature, ROM normal, no CVA tenderness, no spinal tenderness   CVS: There is good S1  S2, rhythm is regular.  ABDOMEN: Globular and soft, nontender to palpation, non distended, no masses, no organomegaly, good bowel sounds, no rebound or guarding, no peritoneal signs.   EXTREMITIES:Left leg in velcro brace, FWBAT  no pedal edema, no cyanosis or clubbing,  no calf tenderness, normal cap refill, no joint swelling.  SKIN: Warm and dry, no erythema noted, no rashes or lesions.  NEUROLOGICAL: The patient is oriented to person, place and time. Strength and sensation are grossly intact. Face is symmetric.                    LABS:    Lab Results   Component Value Date    WBC 10.0 03/19/2020    HGB 8.8 (L) 03/20/2020    HCT 23.7 (L) 03/19/2020    MCV 94 03/19/2020     (H) 03/19/2020       Results for orders placed or performed during the hospital encounter of 03/18/20   Basic Metabolic Panel   Result Value Ref Range    Sodium 133 (L) 136 - 145 mmol/L    Potassium 3.7 3.5 - 5.0 mmol/L    Chloride 101 98 - 107 mmol/L    CO2 27 22 - 31 mmol/L    Anion Gap, Calculation 5 5 - 18 mmol/L    Glucose 88 70 - 125 mg/dL    Calcium 7.7 (L) 8.5 - 10.5 mg/dL    BUN 12 8 - 28 mg/dL    Creatinine 1.00 0.70 - 1.30 mg/dL    GFR MDRD Af Amer >60 >60 mL/min/1.73m2    GFR MDRD Non Af Amer >60 >60 mL/min/1.73m2           No results found for: HGBA1C  Vitamin D, Total (25-Hydroxy)   Date Value Ref Range Status   08/31/2018 39.7 30.0 - 80.0 ng/mL Final     No results found for: [id number]    ASSESSMENT/PLAN:  1. Anemia due to blood loss, acute    2. Septicemia (H)    3. Arthritis of left knee      1. Anemia: We will follow hbg, has duodenal ulcer  And blood loss on Protonix two times a day, we will check h pylori stool sample,.    2. Septicemia: See med list, will have ongoing IVAB via right sided PICC, maintained per nursing services.    3. Arthritis of Left knee with sepsis: To wear knee brace, followed by ortho. Ongoing IVAB per nursing. Has full WB status, therapies for ongoing strengthening and safety,      Electronically signed by:  Theresa Fernandez CNP  This progress note was completed using Dragon software and there may be grammatical errors.      45  minutes spent of which greater than  65 % was face to face communication with the patient about above plan of care which included his  code status, NCS role in his care on the TCU , TCU routines and general medical condition review.

## 2021-06-20 NOTE — LETTER
Letter by Theresa Fernandez CNP at      Author: Theresa Fernandez CNP Service: -- Author Type: --    Filed:  Encounter Date: 3/30/2020 Status: (Other)         Patient: Javier Carrillo   MR Number: [id number]   YOB: 1934   Date of Visit: 3/30/2020     [facility]      NAME:  Javier Carrillo             :  1934  MRN: [id number]  CODE STATUS:  FULL CODE    VISIT TYPE: DISCHARGE SUMMARY  FACILYTY: [facility] [[id number]]                    PRIMARY CARE PROVIDER: Hernan Gregory MD   Hospitalization: [place] 3/8 [facility] 3/18 to 3/20 2020    DISCHARGE DIAGNOSIS:      1. Staphylococcal arthritis of left knee (H)    2. Upper GI bleed         DISCHARGE MEDICATIONS:         Medication List          Accurate as of 2020  2:44 PM. If you have any questions, ask your nurse or doctor.            CONTINUE taking these medications    acetaminophen 500 MG tablet  Commonly known as:  TYLENOL     amLODIPine 5 MG tablet  Commonly known as:  NORVASC  Take 1 tablet (5 mg total) by mouth daily.     calcium (as carbonate) 200 mg calcium (500 mg) chewable tablet  Commonly known as:  TUMS     calcium carbonate-vitamin D3 250-125 mg-unit Tab per tablet  Commonly known as:  OS-DEVON 250+ D     cefTRIAXone 2 gram Solr     lisinopriL 20 MG tablet  Commonly known as:  PRINIVIL,ZESTRIL  TAKE 1 TABLET BY MOUTH EVERY DAY     melatonin 3 mg Tab tablet     omeprazole 20 MG capsule  Commonly known as:  PriLOSEC  Take 1 capsule (20 mg total) by mouth daily before breakfast.     pantoprazole 40 MG tablet  Commonly known as:  PROTONIX  Take 1 tablet (40 mg total) by mouth 2 (two) times a day before meals.     traMADoL 50 mg tablet  Commonly known as:  ULTRAM  Take 1 tablet (50 mg total) by mouth every 6 (six) hours as needed for pain.            HISTORY OF PRESENT ILLNESS: Javier Carrillo is a 85 y.o. male is being seen today for a face to face visit for  an anticipated [place] home  in the am. He will require HHA for RN, on IVAB until 4/21 and PT for ongoing therapies. Pt originally in TCU S/P septic left knee requiring IVAB. Unfortunately he developed loose tarry stools and his HBG dropped. Sent back to acute care and EGD revealed duodenal ulcers. He received blood transfusion, stabilized and is now back to the TCU. He does have a left sided  velcro brace to leg. Now has been dced WBAT and a right sided PICC line for ABX therapies. He has been having education on self administering his IV but will need reinforcement from home care.      SKILLED NURSING FACILITY COURSE:  During this TCU stay, patient completed all anticipated goals of therapy.      PHYSICAL EXAMINATION:    Vitals:    03/30/20 1441   BP: 161/76   Pulse: 85   Temp: 98  F (36.7  C)   Weight: 177 lb (80.3 kg)         GENERAL: Awake, Alert, oriented x3, not in any form of acute distress, answers questions appropriately, follows simple commands, conversant  HEENT: Head is normocephalic with normal hair distribution. No evidence of trauma. Ears: No acute purulent discharge. Eyes: Conjunctivae pink with no scleral jaundice. Nose: Normal mucosa and septum. NECK: Supple with no cervical or supraclavicular lymphadenopathy. Trachea is midline.   CHEST: No tenderness or deformity, no crepitus  LUNG: Clear to auscultation with good chest expansion. There are no crackles or wheezes, normal AP diameter.  BACK: No kyphosis of the thoracic spine. Symmetric, no curvature, ROM normal, no CVA tenderness, no spinal tenderness   CVS: There is good S1  S2,  rhythm is regular.  ABDOMEN: Globular and soft, nontender to palpation, non distended, no masses, no organomegaly, good bowel sounds, no rebound or guarding, no peritoneal signs.   EXTREMITIES: Atraumatic. Full range of motion on both upper and lower extremities, there is no tenderness to palpation, no pedal edema, no cyanosis or clubbing, no calf tenderness, normal cap refill, aged arthritic joint  swelling.  SKIN: Warm and dry, no erythema noted, no rashes or lesions. Left knee incision healing well aproximated  NEUROLOGICAL: The patient is oriented to person, place and time. Strength and sensation are grossly intact. Face is symmetric.      LABS:  All labs reviewed in the nursing home record.        DISCHARGE PLAN: I certify that this patient is under Dr. Harrison's care, seen by the NP, and had a face-to-face encounter that meets the physician face-to-face encounter requirements.  The encounter was in whole, or part related to the primary reason for home health.  The Patient is homebound due to: Infected knee and  it is taxing and it will take a considerable amount of effort for patient to leave the home.  He is dependent on others for transportation.  The patient is confined to hIS home and needs intermittent skilled nursing, PT RN.  The patient has been under the care of Dr. Harrison/NP and Dr. Harrison  initiated the establishment of the plan of care.        Patient to be followed by home care for physical therapy to eval and treat for strengthening, balance, endurance, and safety with mobility, and ambulation.  Patient to be followed by home care for nursing services for medication set up and teaching, symptom and disease processes monitoring and education.    Patient to be followed by home care for home health aid services for bathing and ADL needs.  Planned discharge.  All therapy goals have been met.  Family will assist with discharge and transportation.      Patient will follow up with PCP within 7- days after discharge for medication mangagment and appropriate lab studies.    Post Discharge Medication Reconciliation Status: discharge medications reconciled and changed, per note/orders (see AVS)      Electronically signed by:  Theresa Fernandez CNP  This progress note was completed using Dragon software and there may be grammatical errors.      For documentation purposes, chart review, medication  management, and discharge coordination of care was greater than 35 minutes

## 2021-06-26 ENCOUNTER — HEALTH MAINTENANCE LETTER (OUTPATIENT)
Age: 86
End: 2021-06-26

## 2021-06-26 NOTE — PROGRESS NOTES
Progress Notes by Amaya Aden MD at 8/14/2018  2:30 PM     Author: Amaya Aden MD Service: -- Author Type: Physician    Filed: 8/20/2018 10:43 AM Encounter Date: 8/14/2018 Status: Signed    : Amaya Aden MD (Physician)           Click to link to [facility] HEART CARE NOTE    Thank you, Dr. Gregory, for asking us to see Javier Carrillo at the [facility].      Assessment/Recommendations   Assessment:    1. Hypertension: increase lisinopril to 20 mg daily for better blood pressure control.  2. Orthostasis: symptoms consistent with orthostasis now resolved after stopped HCTZ.  Stress testing and echo were unremarkable.  No further recommendations at this time.   3. May follow up on an as needed basis.        History of Present Illness    Mr. Javier Carrillo is a 83 y.o. male with history of hypertension and GERD who was recently hospitalized with near syncope and hypotension.  He had his hydrochlorothiazide stopped and lisinopril added.  He also underwent a lexiscan nuclear stress test that was negative for inducible ischemia.  Echocardiogram demonstrated aortic valve sclerosis without significant stenosis and otherwise preserved left ventricular systolic function.  He was having episodes of near syncope whenever he stood for ten minutes or longer.  He felt like he was going to pass out.  Once HCTZ was stopped he has felt better.  He has noted that his blood pressure has remained elevated at 140-150/60-70.      Lexiscan nuclear stress test  7/18/18    The pharmacologic nuclear stress test is negative for inducible myocardial ischemia or infarction.    The left ventricular ejection fraction is greater than 70%.    There is no prior study available.    The findings of this examination were communicated to the nursing staff at [facility].    Echocardiogram 7/18/18    Left ventricle ejection fraction is normal. The calculated left  ventricular ejection fraction is 60% without wall motion abnormality.    Normal right ventricular size and systolic function.    Aortic valve sclerosis with trace aortic insufficiency    No previous study for comparison.       Physical Examination Review of Systems   Vitals:    08/14/18 1430   BP: 140/76   Pulse: 60   Resp: 16     Body mass index is 30.54 kg/(m^2).  Wt Readings from Last 3 Encounters:   08/14/18 195 lb (88.5 kg)   07/18/18 188 lb 14.4 oz (85.7 kg)   08/24/14 190 lb (86.2 kg)       General Appearance:   alert, no apparent distress   HEENT:  no scleral icterus; the mucous membranes are pink and moist                                  Neck: jugular venous pressure normal   Chest: the spine is straight and the chest is symmetric   Lungs:   respirations unlabored; the lungs are clear to auscultation   Cardiovascular:   regular rhythm with normal first and second heart sounds and no murmurs or gallops   Abdomen:  no organomegaly, masses, bruits, or tenderness; bowel sounds are present   Extremities: no cyanosis, clubbing, or edema   Skin: no xanthelasma    General: WNL  Eyes: WNL  Ears/Nose/Throat: WNL  Lungs: WNL  Heart: WNL  Stomach: WNL  Bladder: WNL  Muscle/Joints: WNL  Skin: WNL  Nervous System: WNL  Mental Health: WNL     Blood: WNL     Medical History  Surgical History Family History Social History   Past Medical History:   Diagnosis Date   ? GERD (gastroesophageal reflux disease)    ? HTN (hypertension)     No past surgical history on file. No family history of premature coronary artery disease Social History     Social History   ? Marital status:      Spouse name: N/A   ? Number of children: N/A   ? Years of education: N/A     Occupational History   ? Not on file.     Social History Main Topics   ? Smoking status: Never Smoker   ? Smokeless tobacco: Never Used   ? Alcohol use No   ? Drug use: No   ? Sexual activity: No     Other Topics Concern   ? Not on file     Social History Narrative           Medications  Allergies   Current Outpatient Prescriptions   Medication Sig Dispense Refill   ? aspirin-acetaminophen-caffeine (EXCEDRIN MIGRAINE) 250-250-65 mg per tablet Take 1 tablet by mouth 2 (two) times a day as needed for pain.     ? calcium carb/magnesium ox/zinc (CALCIUM CARBONATE-MAG OXIDE-ZN ORAL) Take 1 tablet by mouth daily.     ? calcium carbonate (OS-DEVON) 600 mg calcium (1,500 mg) tablet Take 600 mg by mouth daily.     ? calcium, as carbonate, (TUMS) 200 mg calcium (500 mg) chewable tablet Chew 1-2 tablets daily as needed for heartburn.     ? cholecalciferol, vitamin D3, 1,000 unit tablet Take 1,000 Units by mouth daily with lunch.     ? lisinopril (PRINIVIL,ZESTRIL) 20 MG tablet Take 1 tablet (20 mg total) by mouth daily. 90 tablet 3   ? omega 3-dha-epa-fish oil (FISH OIL) 60- mg cap capsule Take 1,200 mg by mouth daily.      ? omeprazole (PRILOSEC) 20 MG capsule Take 1 capsule (20 mg total) by mouth daily before breakfast. 30 capsule 0   ? ubidecarenone (COENZYME Q10) 100 mg Tab tablet Take 100 mg by mouth 3 (three) times a day.       No current facility-administered medications for this visit.       Allergies   Allergen Reactions   ? Levaquin [Levofloxacin]      Tendon pain         Lab Results    Chemistry/lipid CBC Cardiac Enzymes/BNP/TSH/INR   Lab Results   Component Value Date    CHOL 150 07/23/2018    HDL 43 07/23/2018    LDLCALC 89 07/23/2018    TRIG 89 07/23/2018    CREATININE 1.04 07/18/2018    BUN 16 07/18/2018    K 3.6 07/18/2018     07/18/2018     07/18/2018    CO2 26 07/18/2018    Lab Results   Component Value Date    WBC 7.0 07/18/2018    HGB 12.3 (L) 07/18/2018    HCT 35.8 (L) 07/18/2018    MCV 92 07/18/2018     07/18/2018    Lab Results   Component Value Date    TROPONINI <0.01 07/18/2018    BNP <10 07/17/2018    TSH 1.53 07/18/2018    INR 1.02 07/17/2018

## 2021-06-28 NOTE — PROGRESS NOTES
Progress Notes by Dilip Cruz MD at 2/27/2020  1:50 PM     Author: Dilip Cruz MD Service: -- Author Type: Physician    Filed: 2/27/2020  2:36 PM Encounter Date: 2/27/2020 Status: Signed    : Dilip rCuz MD (Physician)       CARDIOLOGY Rapid Access CLINIC CONSULT NOTE     Assessment/Plan:   1. Orthostatic hypotension.  Advised discontinuation of hydrochlorothiazide which he apparently has resumed.  We discussed that alternate agents for hypertension would be more useful if his blood pressure is poorly controlled.  A beta-blocker may be a better choice for him.  No evidence of arrhythmias.    Follow up as needed     History of Present Illness:     It is my pleasure to see Javier Carrillo at the [facility] RAPID ACCESS clinic for evaluation of near syncope.    Javier Carrillo is a 85 y.o. male with a past medical history of hypertension and gastroesophageal reflux disease.  He also has a history of near syncope, felt to be related to orthostatic hypotension which resolved with discontinuation of hydrochlorothiazide.  At some point over the past year, hydrochlorothiazide was resumed and he is again began noticing episodes of lightheadedness.  These episodes chiefly occur with standing up in [facility], but last week it occurred after an orthopedic clinic visit.  His blood pressure was noted be 57/37 standing with a pulse of 72.  No associated shortness of breath chest pain or diaphoresis.  There was no fall but he did reportedly slumped over in his chair for less than a minute.    Emergency room evaluation was unrevealing.  He has no history of atrial fibrillation or coronary artery disease.  He brought a list of blood pressure measurements from his home, generally they are showing systolics in the 110 to 130 range.  He does try to keep himself well-hydrated, drinking water and Gatorade throughout the course the day.    Past Medical History:     Patient Active  Problem List   Diagnosis   ? Chest pain   ? Gastroesophageal reflux disease without esophagitis   ? Dyspnea on exertion   ? Orthostatic hypotension   ? Benign essential hypertension   ? Arthritis of left knee   ? History of total knee replacement   ? Hyperlipidemia   ? Malignant melanoma of upper arm (H)   ? Osteopenia       Past Surgical History:   History reviewed. No pertinent surgical history.    Family History:     Family History   Problem Relation Age of Onset   ? Heart failure Sister 80     Family history reviewed and is not pertinent to the chief complaint or presenting problem    Social History:    reports that he has never smoked. He has never used smokeless tobacco. He reports that he does not drink alcohol or use drugs.    Exercise: Limited because of knee pains.  Now status post bilateral knee replacements is beginning to increase activities.    Sleep: Restorative without daytime sleepiness.    Meds:     Current Outpatient Medications on File Prior to Visit   Medication Sig Dispense Refill   ? aspirin-acetaminophen-caffeine (EXCEDRIN MIGRAINE) 250-250-65 mg per tablet Take 1 tablet by mouth 2 (two) times a day as needed for pain.     ? calcium carb/magnesium ox/zinc (CALCIUM CARBONATE-MAG OXIDE-ZN ORAL) Take 1 tablet by mouth daily.     ? calcium carbonate (OS-DEVON) 600 mg calcium (1,500 mg) tablet Take 600 mg by mouth daily.     ? calcium, as carbonate, (TUMS) 200 mg calcium (500 mg) chewable tablet Chew 1-2 tablets daily as needed for heartburn.     ? cholecalciferol, vitamin D3, 1,000 unit tablet Take 1,000 Units by mouth daily with lunch.     ? lisinopril (PRINIVIL,ZESTRIL) 20 MG tablet TAKE 1 TABLET BY MOUTH EVERY DAY 90 tablet 0   ? omega 3-dha-epa-fish oil (FISH OIL) 60- mg cap capsule Take 1,200 mg by mouth daily.      ? omeprazole (PRILOSEC) 20 MG capsule Take 1 capsule (20 mg total) by mouth daily before breakfast. 30 capsule 0   ? ubidecarenone (COENZYME Q10) 100 mg Tab tablet Take 100  "mg by mouth 3 (three) times a day.       No current facility-administered medications on file prior to visit.        Allergies:   Levaquin [levofloxacin]    Review of Systems:     General: WNL  Eyes: WNL  Ears/Nose/Throat: WNL  Lungs: WNL  Heart: WNL  Stomach: WNL  Bladder: WNL  Muscle/Joints: WNL  Skin: WNL  Nervous System: Loss of Balance  Mental Health: WNL     Blood: WNL        Objective:      Physical Exam  192 lb (87.1 kg)  5' 7\" (1.702 m)  Body mass index is 30.07 kg/m .  /68 (Patient Site: Right Arm, Patient Position: Sitting, Cuff Size: Adult Large)   Pulse 80   Resp 16   Ht 5' 7\" (1.702 m)   Wt 192 lb (87.1 kg)   BMI 30.07 kg/m      Blood pressure supine 150/72.  Standing 126/56, with no symptoms.    General Appearance : Awake, Alert, No acute distress  HEENT: No Scleral icterus; the mucous membranes were pink and moist.  Conjunctivae not injected  Neck:  No cervical bruits, jugular venous distention, or thyromegaly   Chest: The spine was straight. Chest wall symmetric  Lungs: Respirations unlabored; the lungs are clear to auscultation.  No wheezing   Cardiovascular:   Normal point of maximal impulse.  Auscultation reveals normal first and second heart sounds with no murmurs, rubs, or gallops.  Carotid, radial, and dorsalis pedal pulses are intact and symmetric.    Abdomen: No organomegaly, masses, bruits, or tenderness. Bowels sounds are present  Extremities: No edema  Skin: No xanthelasma. Warm, Dry.  Musculoskeletal: No tenderness.  Neurologic: Alert and oriented ×3. Speech is fluent.      EKG(personally reviewed):  2/14/2020: Normal sinus rhythm at 75 bpm.  Low voltage QRS.  Otherwise normal ECG    Cardiac Imaging Studies:  Pharmacologic nuclear stress testing 7/2018:    The pharmacologic nuclear stress test is negative for inducible myocardial ischemia or infarction.    The left ventricular ejection fraction is greater than 70%.    There is no prior study available.    Echocardiogram " 7/2018:    Left ventricle ejection fraction is normal. The calculated left ventricular ejection fraction is 60% without wall motion abnormality.    Normal right ventricular size and systolic function.    Aortic valve sclerosis with trace aortic insufficiency    No previous study for comparison.    Lab Review   Lab Results   Component Value Date     (L) 02/14/2020    K 4.2 02/14/2020    CL 97 (L) 02/14/2020    CO2 24 02/14/2020    BUN 30 (H) 02/14/2020    CREATININE 1.36 (H) 02/14/2020    CALCIUM 8.7 02/14/2020     Lab Results   Component Value Date    WBC 13.4 (H) 02/14/2020    HGB 12.0 (L) 02/14/2020    HCT 34.9 (L) 02/14/2020    MCV 93 02/14/2020     02/14/2020     Lab Results   Component Value Date    CHOL 150 07/23/2018    TRIG 89 07/23/2018    HDL 43 07/23/2018    LDLCALC 89 07/23/2018     Lab Results   Component Value Date    TROPONINI <0.01 02/14/2020     Lab Results   Component Value Date    BNP <10 07/17/2018     Lab Results   Component Value Date    TSH 1.53 07/18/2018       Dilip Cruz MD [facility]      His note created using Dragon voice recognition software. Sound alike errors may have escaped editing.

## 2021-06-28 NOTE — PROGRESS NOTES
Progress Notes by Theresa Fernandez CNP at 3/30/2020  8:13 AM     Author: Theresa Fernandez CNP Service: -- Author Type: Nurse Practitioner    Filed: 3/30/2020  2:52 PM Encounter Date: 3/30/2020 Status: Attested    : Theresa Fernandez CNP (Nurse Practitioner) Cosigner: Oriana Harrison MBBS at 3/30/2020  7:58 PM    Attestation signed by Oriana Harrison MBBS at 3/30/2020  7:58 PM    Agree with dc note                [facility]      NAME:  Javier Carrillo             :  1934  MRN: [id number]  CODE STATUS:  FULL CODE    VISIT TYPE: DISCHARGE SUMMARY  FACILYTY: [facility] [[id number]]                    PRIMARY CARE PROVIDER: Hernan Gregory MD   Hospitalization: [place] 3/8 [facility] 3/18 to 3/20 2020    DISCHARGE DIAGNOSIS:      1. Staphylococcal arthritis of left knee (H)    2. Upper GI bleed         DISCHARGE MEDICATIONS:         Medication List          Accurate as of 2020  2:44 PM. If you have any questions, ask your nurse or doctor.            CONTINUE taking these medications    acetaminophen 500 MG tablet  Commonly known as:  TYLENOL     amLODIPine 5 MG tablet  Commonly known as:  NORVASC  Take 1 tablet (5 mg total) by mouth daily.     calcium (as carbonate) 200 mg calcium (500 mg) chewable tablet  Commonly known as:  TUMS     calcium carbonate-vitamin D3 250-125 mg-unit Tab per tablet  Commonly known as:  OS-DEVON 250+ D     cefTRIAXone 2 gram Solr     lisinopriL 20 MG tablet  Commonly known as:  PRINIVIL,ZESTRIL  TAKE 1 TABLET BY MOUTH EVERY DAY     melatonin 3 mg Tab tablet     omeprazole 20 MG capsule  Commonly known as:  PriLOSEC  Take 1 capsule (20 mg total) by mouth daily before breakfast.     pantoprazole 40 MG tablet  Commonly known as:  PROTONIX  Take 1 tablet (40 mg total) by mouth 2 (two) times a day before meals.     traMADoL 50 mg tablet  Commonly known as:  ULTRAM  Take 1 tablet (50 mg total) by mouth every 6 (six) hours as needed  for pain.            HISTORY OF PRESENT ILLNESS: Javier Carrillo is a 85 y.o. male is being seen today for a face to face visit for  an anticipated dc home in the am. He will require HHA for RN, on IVAB until 4/21 and PT for ongoing therapies. Pt originally in TCU S/P septic left knee requiring IVAB. Unfortunately he developed loose tarry stools and his HBG dropped. Sent back to acute care and EGD revealed duodenal ulcers. He received blood transfusion, stabilized and is now back to the TCU. He does have a left sided  velcro brace to leg. Now has been dced WBAT and a right sided PICC line for ABX therapies. He has been having education on self administering his IV but will need reinforcement from home care.      SKILLED NURSING FACILITY COURSE:  During this TCU stay, patient completed all anticipated goals of therapy.      PHYSICAL EXAMINATION:    Vitals:    03/30/20 1441   BP: 161/76   Pulse: 85   Temp: 98  F (36.7  C)   Weight: 177 lb (80.3 kg)         GENERAL: Awake, Alert, oriented x3, not in any form of acute distress, answers questions appropriately, follows simple commands, conversant  HEENT: Head is normocephalic with normal hair distribution. No evidence of trauma. Ears: No acute purulent discharge. Eyes: Conjunctivae pink with no scleral jaundice. Nose: Normal mucosa and septum. NECK: Supple with no cervical or supraclavicular lymphadenopathy. Trachea is midline.   CHEST: No tenderness or deformity, no crepitus  LUNG: Clear to auscultation with good chest expansion. There are no crackles or wheezes, normal AP diameter.  BACK: No kyphosis of the thoracic spine. Symmetric, no curvature, ROM normal, no CVA tenderness, no spinal tenderness   CVS: There is good S1  S2,  rhythm is regular.  ABDOMEN: Globular and soft, nontender to palpation, non distended, no masses, no organomegaly, good bowel sounds, no rebound or guarding, no peritoneal signs.   EXTREMITIES: Atraumatic. Full range of motion on both upper and  lower extremities, there is no tenderness to palpation, no pedal edema, no cyanosis or clubbing, no calf tenderness, normal cap refill, aged arthritic joint swelling.  SKIN: Warm and dry, no erythema noted, no rashes or lesions. Left knee incision healing well aproximated  NEUROLOGICAL: The patient is oriented to person, place and time. Strength and sensation are grossly intact. Face is symmetric.      LABS:  All labs reviewed in the nursing home record.        DISCHARGE PLAN: I certify that this patient is under Dr. Harrison's care, seen by the NP, and had a face-to-face encounter that meets the physician face-to-face encounter requirements.  The encounter was in whole, or part related to the primary reason for home health.  The Patient is homebound due to: Infected knee and  it is taxing and it will take a considerable amount of effort for patient to leave the home.  He is dependent on others for transportation.  The patient is confined to hIS home and needs intermittent skilled nursing, PT RN.  The patient has been under the care of Dr. Harrison/NP and Dr. Harrison  initiated the establishment of the plan of care.        Patient to be followed by home care for physical therapy to eval and treat for strengthening, balance, endurance, and safety with mobility, and ambulation.  Patient to be followed by home care for nursing services for medication set up and teaching, symptom and disease processes monitoring and education.    Patient to be followed by home care for home health aid services for bathing and ADL needs.  Planned discharge.  All therapy goals have been met.  Family will assist with discharge and transportation.      Patient will follow up with PCP within 7- days after discharge for medication mangagment and appropriate lab studies.    Post Discharge Medication Reconciliation Status: discharge medications reconciled and changed, per note/orders (see AVS)      Electronically signed by:  Theresa Fernandez  CNP  This progress note was completed using Dragon software and there may be grammatical errors.      For documentation purposes, chart review, medication management, and discharge coordination of care was greater than 35 minutes

## 2021-08-13 ENCOUNTER — LAB REQUISITION (OUTPATIENT)
Dept: LAB | Facility: CLINIC | Age: 86
End: 2021-08-13

## 2021-08-13 DIAGNOSIS — R55 SYNCOPE AND COLLAPSE: ICD-10-CM

## 2021-08-13 DIAGNOSIS — D64.9 ANEMIA, UNSPECIFIED: ICD-10-CM

## 2021-08-13 LAB
ALBUMIN SERPL-MCNC: 4 G/DL (ref 3.5–5)
ALP SERPL-CCNC: 110 U/L (ref 45–120)
ALT SERPL W P-5'-P-CCNC: <9 U/L (ref 0–45)
ANION GAP SERPL CALCULATED.3IONS-SCNC: 10 MMOL/L (ref 5–18)
AST SERPL W P-5'-P-CCNC: 21 U/L (ref 0–40)
BILIRUB SERPL-MCNC: 1 MG/DL (ref 0–1)
BUN SERPL-MCNC: 23 MG/DL (ref 8–28)
CALCIUM SERPL-MCNC: 9.7 MG/DL (ref 8.5–10.5)
CHLORIDE BLD-SCNC: 100 MMOL/L (ref 98–107)
CO2 SERPL-SCNC: 26 MMOL/L (ref 22–31)
CREAT SERPL-MCNC: 1.16 MG/DL (ref 0.7–1.3)
GFR SERPL CREATININE-BSD FRML MDRD: 57 ML/MIN/1.73M2
GLUCOSE BLD-MCNC: 95 MG/DL (ref 70–125)
POTASSIUM BLD-SCNC: 4.4 MMOL/L (ref 3.5–5)
PROT SERPL-MCNC: 7 G/DL (ref 6–8)
SODIUM SERPL-SCNC: 136 MMOL/L (ref 136–145)

## 2021-08-13 PROCEDURE — 80053 COMPREHEN METABOLIC PANEL: CPT | Performed by: FAMILY MEDICINE

## 2021-08-13 PROCEDURE — 36415 COLL VENOUS BLD VENIPUNCTURE: CPT | Performed by: FAMILY MEDICINE

## 2021-08-24 ENCOUNTER — ANCILLARY PROCEDURE (OUTPATIENT)
Dept: BONE DENSITY | Facility: CLINIC | Age: 86
End: 2021-08-24
Attending: FAMILY MEDICINE
Payer: COMMERCIAL

## 2021-08-24 DIAGNOSIS — M85.80 OSTEOPENIA: ICD-10-CM

## 2021-08-24 PROCEDURE — 77080 DXA BONE DENSITY AXIAL: CPT | Mod: TC | Performed by: RADIOLOGY

## 2021-10-16 ENCOUNTER — HEALTH MAINTENANCE LETTER (OUTPATIENT)
Age: 86
End: 2021-10-16

## 2022-01-01 ENCOUNTER — HEALTH MAINTENANCE LETTER (OUTPATIENT)
Age: 87
End: 2022-01-01

## 2022-01-01 ENCOUNTER — LAB REQUISITION (OUTPATIENT)
Dept: LAB | Facility: CLINIC | Age: 87
End: 2022-01-01

## 2022-01-01 ENCOUNTER — TRANSFERRED RECORDS (OUTPATIENT)
Dept: HEALTH INFORMATION MANAGEMENT | Facility: CLINIC | Age: 87
End: 2022-01-01

## 2022-01-01 DIAGNOSIS — R68.83 CHILLS (WITHOUT FEVER): ICD-10-CM

## 2022-01-01 LAB
BASOPHILS # BLD AUTO: 0.1 10E3/UL (ref 0–0.2)
BASOPHILS NFR BLD AUTO: 1 %
EOSINOPHIL # BLD AUTO: 0.4 10E3/UL (ref 0–0.7)
EOSINOPHIL NFR BLD AUTO: 4 %
ERYTHROCYTE [DISTWIDTH] IN BLOOD BY AUTOMATED COUNT: 13.5 % (ref 10–15)
HCT VFR BLD AUTO: 40.6 % (ref 40–53)
HGB BLD-MCNC: 13.3 G/DL (ref 13.3–17.7)
IMM GRANULOCYTES # BLD: 0 10E3/UL
IMM GRANULOCYTES NFR BLD: 0 %
LYMPHOCYTES # BLD AUTO: 2.1 10E3/UL (ref 0.8–5.3)
LYMPHOCYTES NFR BLD AUTO: 19 %
MCH RBC QN AUTO: 30.4 PG (ref 26.5–33)
MCHC RBC AUTO-ENTMCNC: 32.8 G/DL (ref 31.5–36.5)
MCV RBC AUTO: 93 FL (ref 78–100)
MONOCYTES # BLD AUTO: 0.8 10E3/UL (ref 0–1.3)
MONOCYTES NFR BLD AUTO: 7 %
NEUTROPHILS # BLD AUTO: 7.5 10E3/UL (ref 1.6–8.3)
NEUTROPHILS NFR BLD AUTO: 69 %
NRBC # BLD AUTO: 0 10E3/UL
NRBC BLD AUTO-RTO: 0 /100
PATH REPORT.COMMENTS IMP SPEC: NORMAL
PATH REPORT.COMMENTS IMP SPEC: NORMAL
PATH REPORT.FINAL DX SPEC: NORMAL
PATH REPORT.RELEVANT HX SPEC: NORMAL
PLATELET # BLD AUTO: 349 10E3/UL (ref 150–450)
RBC # BLD AUTO: 4.37 10E6/UL (ref 4.4–5.9)
RETICS # AUTO: 0.06 10E6/UL (ref 0.03–0.1)
RETICS/RBC NFR AUTO: 1.4 % (ref 0.5–2)
WBC # BLD AUTO: 10.9 10E3/UL (ref 4–11)

## 2022-01-01 PROCEDURE — 85045 AUTOMATED RETICULOCYTE COUNT: CPT | Performed by: FAMILY MEDICINE

## 2022-01-01 PROCEDURE — 85025 COMPLETE CBC W/AUTO DIFF WBC: CPT | Performed by: FAMILY MEDICINE

## 2022-01-01 PROCEDURE — 85060 BLOOD SMEAR INTERPRETATION: CPT | Performed by: PATHOLOGY

## 2023-01-01 ENCOUNTER — APPOINTMENT (OUTPATIENT)
Dept: OCCUPATIONAL THERAPY | Facility: CLINIC | Age: 88
DRG: 392 | End: 2023-01-01
Payer: COMMERCIAL

## 2023-01-01 ENCOUNTER — APPOINTMENT (OUTPATIENT)
Dept: PHYSICAL THERAPY | Facility: CLINIC | Age: 88
DRG: 392 | End: 2023-01-01
Attending: INTERNAL MEDICINE
Payer: COMMERCIAL

## 2023-01-01 ENCOUNTER — TRANSFERRED RECORDS (OUTPATIENT)
Dept: HEALTH INFORMATION MANAGEMENT | Facility: CLINIC | Age: 88
End: 2023-01-01

## 2023-01-01 ENCOUNTER — TELEPHONE (OUTPATIENT)
Dept: CARDIOLOGY | Facility: CLINIC | Age: 88
End: 2023-01-01
Payer: COMMERCIAL

## 2023-01-01 ENCOUNTER — APPOINTMENT (OUTPATIENT)
Dept: PHYSICAL THERAPY | Facility: CLINIC | Age: 88
DRG: 392 | End: 2023-01-01
Payer: COMMERCIAL

## 2023-01-01 ENCOUNTER — MEDICAL CORRESPONDENCE (OUTPATIENT)
Dept: HEALTH INFORMATION MANAGEMENT | Facility: CLINIC | Age: 88
End: 2023-01-01

## 2023-01-01 ENCOUNTER — OFFICE VISIT (OUTPATIENT)
Dept: CARDIOLOGY | Facility: CLINIC | Age: 88
End: 2023-01-01
Payer: COMMERCIAL

## 2023-01-01 ENCOUNTER — HOSPITAL ENCOUNTER (INPATIENT)
Facility: CLINIC | Age: 88
LOS: 5 days | Discharge: HOME HEALTH | DRG: 392 | End: 2023-03-01
Attending: EMERGENCY MEDICINE | Admitting: INTERNAL MEDICINE
Payer: COMMERCIAL

## 2023-01-01 ENCOUNTER — APPOINTMENT (OUTPATIENT)
Dept: CARDIOLOGY | Facility: CLINIC | Age: 88
DRG: 392 | End: 2023-01-01
Attending: INTERNAL MEDICINE
Payer: COMMERCIAL

## 2023-01-01 ENCOUNTER — PATIENT OUTREACH (OUTPATIENT)
Dept: CARE COORDINATION | Facility: CLINIC | Age: 88
End: 2023-01-01
Payer: COMMERCIAL

## 2023-01-01 ENCOUNTER — APPOINTMENT (OUTPATIENT)
Dept: CT IMAGING | Facility: CLINIC | Age: 88
DRG: 392 | End: 2023-01-01
Attending: EMERGENCY MEDICINE
Payer: COMMERCIAL

## 2023-01-01 ENCOUNTER — APPOINTMENT (OUTPATIENT)
Dept: OCCUPATIONAL THERAPY | Facility: CLINIC | Age: 88
DRG: 392 | End: 2023-01-01
Attending: INTERNAL MEDICINE
Payer: COMMERCIAL

## 2023-01-01 ENCOUNTER — DOCUMENTATION ONLY (OUTPATIENT)
Dept: HOME HEALTH SERVICES | Facility: CLINIC | Age: 88
End: 2023-01-01
Payer: COMMERCIAL

## 2023-01-01 ENCOUNTER — APPOINTMENT (OUTPATIENT)
Dept: SPEECH THERAPY | Facility: CLINIC | Age: 88
DRG: 392 | End: 2023-01-01
Attending: INTERNAL MEDICINE
Payer: COMMERCIAL

## 2023-01-01 VITALS
BODY MASS INDEX: 23.89 KG/M2 | RESPIRATION RATE: 16 BRPM | HEART RATE: 92 BPM | WEIGHT: 148 LBS | SYSTOLIC BLOOD PRESSURE: 92 MMHG | OXYGEN SATURATION: 100 % | DIASTOLIC BLOOD PRESSURE: 50 MMHG

## 2023-01-01 VITALS
OXYGEN SATURATION: 98 % | HEART RATE: 95 BPM | BODY MASS INDEX: 24.08 KG/M2 | RESPIRATION RATE: 16 BRPM | HEIGHT: 66 IN | WEIGHT: 149.8 LBS | DIASTOLIC BLOOD PRESSURE: 68 MMHG | TEMPERATURE: 97.5 F | SYSTOLIC BLOOD PRESSURE: 131 MMHG

## 2023-01-01 DIAGNOSIS — M62.81 GENERALIZED MUSCLE WEAKNESS: ICD-10-CM

## 2023-01-01 DIAGNOSIS — R55 SYNCOPE, UNSPECIFIED SYNCOPE TYPE: ICD-10-CM

## 2023-01-01 DIAGNOSIS — I10 BENIGN ESSENTIAL HYPERTENSION: Primary | ICD-10-CM

## 2023-01-01 DIAGNOSIS — R00.0 TACHYCARDIA: ICD-10-CM

## 2023-01-01 LAB
ALBUMIN UR-MCNC: 20 MG/DL
ANION GAP SERPL CALCULATED.3IONS-SCNC: 11 MMOL/L (ref 5–18)
ANION GAP SERPL CALCULATED.3IONS-SCNC: 16 MMOL/L (ref 5–18)
ANION GAP SERPL CALCULATED.3IONS-SCNC: 6 MMOL/L (ref 5–18)
ANION GAP SERPL CALCULATED.3IONS-SCNC: 8 MMOL/L (ref 5–18)
ANION GAP SERPL CALCULATED.3IONS-SCNC: 9 MMOL/L (ref 5–18)
APPEARANCE UR: CLEAR
ATRIAL RATE - MUSE: 123 BPM
BACTERIA BLD CULT: NO GROWTH
BACTERIA BLD CULT: NO GROWTH
BASOPHILS # BLD AUTO: 0.1 10E3/UL (ref 0–0.2)
BASOPHILS NFR BLD AUTO: 0 %
BILIRUB UR QL STRIP: NEGATIVE
BUN SERPL-MCNC: 11 MG/DL (ref 8–28)
BUN SERPL-MCNC: 15 MG/DL (ref 8–28)
BUN SERPL-MCNC: 19 MG/DL (ref 8–28)
BUN SERPL-MCNC: 28 MG/DL (ref 8–28)
BUN SERPL-MCNC: 32 MG/DL (ref 8–28)
C COLI+JEJUNI+LARI FUSA STL QL NAA+PROBE: NOT DETECTED
CALCIUM SERPL-MCNC: 8 MG/DL (ref 8.5–10.5)
CALCIUM SERPL-MCNC: 8.1 MG/DL (ref 8.5–10.5)
CALCIUM SERPL-MCNC: 8.1 MG/DL (ref 8.5–10.5)
CALCIUM SERPL-MCNC: 8.2 MG/DL (ref 8.5–10.5)
CALCIUM SERPL-MCNC: 9.5 MG/DL (ref 8.5–10.5)
CHLORIDE BLD-SCNC: 102 MMOL/L (ref 98–107)
CHLORIDE BLD-SCNC: 103 MMOL/L (ref 98–107)
CHLORIDE BLD-SCNC: 104 MMOL/L (ref 98–107)
CHLORIDE BLD-SCNC: 105 MMOL/L (ref 98–107)
CHLORIDE BLD-SCNC: 107 MMOL/L (ref 98–107)
CK SERPL-CCNC: 1096 U/L (ref 30–190)
CK SERPL-CCNC: 1243 U/L (ref 30–190)
CK SERPL-CCNC: 750 U/L (ref 30–190)
CO2 SERPL-SCNC: 19 MMOL/L (ref 22–31)
CO2 SERPL-SCNC: 21 MMOL/L (ref 22–31)
CO2 SERPL-SCNC: 21 MMOL/L (ref 22–31)
CO2 SERPL-SCNC: 23 MMOL/L (ref 22–31)
CO2 SERPL-SCNC: 24 MMOL/L (ref 22–31)
COLOR UR AUTO: ABNORMAL
CREAT SERPL-MCNC: 0.69 MG/DL (ref 0.7–1.3)
CREAT SERPL-MCNC: 0.72 MG/DL (ref 0.7–1.3)
CREAT SERPL-MCNC: 0.73 MG/DL (ref 0.7–1.3)
CREAT SERPL-MCNC: 0.93 MG/DL (ref 0.7–1.3)
CREAT SERPL-MCNC: 1.22 MG/DL (ref 0.7–1.3)
D DIMER PPP FEU-MCNC: 2.7 UG/ML FEU (ref 0–0.5)
DIASTOLIC BLOOD PRESSURE - MUSE: 70 MMHG
EC STX1 GENE STL QL NAA+PROBE: NOT DETECTED
EC STX2 GENE STL QL NAA+PROBE: NOT DETECTED
EOSINOPHIL # BLD AUTO: 0 10E3/UL (ref 0–0.7)
EOSINOPHIL NFR BLD AUTO: 0 %
ERYTHROCYTE [DISTWIDTH] IN BLOOD BY AUTOMATED COUNT: 13.6 % (ref 10–15)
ERYTHROCYTE [DISTWIDTH] IN BLOOD BY AUTOMATED COUNT: 14.4 % (ref 10–15)
ERYTHROCYTE [DISTWIDTH] IN BLOOD BY AUTOMATED COUNT: 14.4 % (ref 10–15)
ERYTHROCYTE [DISTWIDTH] IN BLOOD BY AUTOMATED COUNT: 14.9 % (ref 10–15)
FLUAV RNA SPEC QL NAA+PROBE: NEGATIVE
FLUBV RNA RESP QL NAA+PROBE: NEGATIVE
GFR SERPL CREATININE-BSD FRML MDRD: 57 ML/MIN/1.73M2
GFR SERPL CREATININE-BSD FRML MDRD: 79 ML/MIN/1.73M2
GFR SERPL CREATININE-BSD FRML MDRD: 88 ML/MIN/1.73M2
GFR SERPL CREATININE-BSD FRML MDRD: 88 ML/MIN/1.73M2
GFR SERPL CREATININE-BSD FRML MDRD: 89 ML/MIN/1.73M2
GLUCOSE BLD-MCNC: 148 MG/DL (ref 70–125)
GLUCOSE BLD-MCNC: 88 MG/DL (ref 70–125)
GLUCOSE BLD-MCNC: 89 MG/DL (ref 70–125)
GLUCOSE BLD-MCNC: 90 MG/DL (ref 70–125)
GLUCOSE BLD-MCNC: 95 MG/DL (ref 70–125)
GLUCOSE UR STRIP-MCNC: NEGATIVE MG/DL
HCT VFR BLD AUTO: 30.4 % (ref 40–53)
HCT VFR BLD AUTO: 32.9 % (ref 40–53)
HCT VFR BLD AUTO: 33.6 % (ref 40–53)
HCT VFR BLD AUTO: 38.1 % (ref 40–53)
HGB BLD-MCNC: 10.1 G/DL (ref 13.3–17.7)
HGB BLD-MCNC: 10.3 G/DL (ref 13.3–17.7)
HGB BLD-MCNC: 10.5 G/DL (ref 13.3–17.7)
HGB BLD-MCNC: 11.1 G/DL (ref 13.3–17.7)
HGB BLD-MCNC: 12.5 G/DL (ref 13.3–17.7)
HGB UR QL STRIP: ABNORMAL
HOLD SPECIMEN: NORMAL
HOLD SPECIMEN: NORMAL
HYALINE CASTS: 7 /LPF
IMM GRANULOCYTES # BLD: 0.1 10E3/UL
IMM GRANULOCYTES NFR BLD: 1 %
INTERPRETATION ECG - MUSE: NORMAL
KETONES UR STRIP-MCNC: NEGATIVE MG/DL
LACTATE SERPL-SCNC: 1.4 MMOL/L (ref 0.7–2)
LEUKOCYTE ESTERASE UR QL STRIP: NEGATIVE
LVEF ECHO: NORMAL
LYMPHOCYTES # BLD AUTO: 0.3 10E3/UL (ref 0.8–5.3)
LYMPHOCYTES NFR BLD AUTO: 1 %
MAGNESIUM SERPL-MCNC: 1.8 MG/DL (ref 1.8–2.6)
MCH RBC QN AUTO: 29.8 PG (ref 26.5–33)
MCH RBC QN AUTO: 30 PG (ref 26.5–33)
MCH RBC QN AUTO: 30.4 PG (ref 26.5–33)
MCH RBC QN AUTO: 30.7 PG (ref 26.5–33)
MCHC RBC AUTO-ENTMCNC: 31.9 G/DL (ref 31.5–36.5)
MCHC RBC AUTO-ENTMCNC: 32.8 G/DL (ref 31.5–36.5)
MCHC RBC AUTO-ENTMCNC: 33 G/DL (ref 31.5–36.5)
MCHC RBC AUTO-ENTMCNC: 33.2 G/DL (ref 31.5–36.5)
MCV RBC AUTO: 90 FL (ref 78–100)
MCV RBC AUTO: 92 FL (ref 78–100)
MCV RBC AUTO: 93 FL (ref 78–100)
MCV RBC AUTO: 95 FL (ref 78–100)
MONOCYTES # BLD AUTO: 0.7 10E3/UL (ref 0–1.3)
MONOCYTES NFR BLD AUTO: 3 %
MUCOUS THREADS #/AREA URNS LPF: PRESENT /LPF
NEUTROPHILS # BLD AUTO: 20.1 10E3/UL (ref 1.6–8.3)
NEUTROPHILS NFR BLD AUTO: 95 %
NITRATE UR QL: NEGATIVE
NOROV GI+II ORF1-ORF2 JNC STL QL NAA+PR: DETECTED
NRBC # BLD AUTO: 0 10E3/UL
NRBC BLD AUTO-RTO: 0 /100
P AXIS - MUSE: 67 DEGREES
PH UR STRIP: 5.5 [PH] (ref 5–7)
PLATELET # BLD AUTO: 335 10E3/UL (ref 150–450)
PLATELET # BLD AUTO: 352 10E3/UL (ref 150–450)
PLATELET # BLD AUTO: 373 10E3/UL (ref 150–450)
PLATELET # BLD AUTO: 473 10E3/UL (ref 150–450)
POTASSIUM BLD-SCNC: 2.9 MMOL/L (ref 3.5–5)
POTASSIUM BLD-SCNC: 3.3 MMOL/L (ref 3.5–5)
POTASSIUM BLD-SCNC: 3.4 MMOL/L (ref 3.5–5)
POTASSIUM BLD-SCNC: 3.4 MMOL/L (ref 3.5–5)
POTASSIUM BLD-SCNC: 3.5 MMOL/L (ref 3.5–5)
POTASSIUM BLD-SCNC: 3.8 MMOL/L (ref 3.5–5)
POTASSIUM BLD-SCNC: 3.9 MMOL/L (ref 3.5–5)
POTASSIUM BLD-SCNC: 4.2 MMOL/L (ref 3.5–5)
PR INTERVAL - MUSE: 152 MS
QRS DURATION - MUSE: 78 MS
QT - MUSE: 308 MS
QTC - MUSE: 440 MS
R AXIS - MUSE: 39 DEGREES
RBC # BLD AUTO: 3.39 10E6/UL (ref 4.4–5.9)
RBC # BLD AUTO: 3.45 10E6/UL (ref 4.4–5.9)
RBC # BLD AUTO: 3.62 10E6/UL (ref 4.4–5.9)
RBC # BLD AUTO: 4.16 10E6/UL (ref 4.4–5.9)
RBC URINE: 1 /HPF
RSV RNA SPEC NAA+PROBE: NEGATIVE
RVA NSP5 STL QL NAA+PROBE: NOT DETECTED
SALMONELLA SP RPOD STL QL NAA+PROBE: NOT DETECTED
SARS-COV-2 RNA RESP QL NAA+PROBE: NEGATIVE
SHIGELLA SP+EIEC IPAH STL QL NAA+PROBE: NOT DETECTED
SODIUM SERPL-SCNC: 133 MMOL/L (ref 136–145)
SODIUM SERPL-SCNC: 134 MMOL/L (ref 136–145)
SODIUM SERPL-SCNC: 136 MMOL/L (ref 136–145)
SODIUM SERPL-SCNC: 137 MMOL/L (ref 136–145)
SODIUM SERPL-SCNC: 139 MMOL/L (ref 136–145)
SP GR UR STRIP: 1.05 (ref 1–1.03)
SYSTOLIC BLOOD PRESSURE - MUSE: 166 MMHG
T AXIS - MUSE: 81 DEGREES
TROPONIN I SERPL-MCNC: 0.05 NG/ML (ref 0–0.29)
UROBILINOGEN UR STRIP-MCNC: <2 MG/DL
V CHOL+PARA RFBL+TRKH+TNAA STL QL NAA+PR: NOT DETECTED
VENTRICULAR RATE- MUSE: 123 BPM
WBC # BLD AUTO: 10.7 10E3/UL (ref 4–11)
WBC # BLD AUTO: 11.4 10E3/UL (ref 4–11)
WBC # BLD AUTO: 12.9 10E3/UL (ref 4–11)
WBC # BLD AUTO: 21.2 10E3/UL (ref 4–11)
WBC URINE: 1 /HPF
Y ENTERO RECN STL QL NAA+PROBE: NOT DETECTED

## 2023-01-01 PROCEDURE — 250N000013 HC RX MED GY IP 250 OP 250 PS 637: Performed by: FAMILY MEDICINE

## 2023-01-01 PROCEDURE — 250N000011 HC RX IP 250 OP 636: Performed by: EMERGENCY MEDICINE

## 2023-01-01 PROCEDURE — 99239 HOSP IP/OBS DSCHRG MGMT >30: CPT | Mod: GC | Performed by: FAMILY MEDICINE

## 2023-01-01 PROCEDURE — 250N000013 HC RX MED GY IP 250 OP 250 PS 637: Performed by: INTERNAL MEDICINE

## 2023-01-01 PROCEDURE — 80048 BASIC METABOLIC PNL TOTAL CA: CPT | Performed by: FAMILY MEDICINE

## 2023-01-01 PROCEDURE — 97116 GAIT TRAINING THERAPY: CPT | Mod: GP

## 2023-01-01 PROCEDURE — 84484 ASSAY OF TROPONIN QUANT: CPT | Performed by: EMERGENCY MEDICINE

## 2023-01-01 PROCEDURE — 80048 BASIC METABOLIC PNL TOTAL CA: CPT | Performed by: INTERNAL MEDICINE

## 2023-01-01 PROCEDURE — G0463 HOSPITAL OUTPT CLINIC VISIT: HCPCS

## 2023-01-01 PROCEDURE — 85027 COMPLETE CBC AUTOMATED: CPT | Performed by: FAMILY MEDICINE

## 2023-01-01 PROCEDURE — 83605 ASSAY OF LACTIC ACID: CPT | Performed by: EMERGENCY MEDICINE

## 2023-01-01 PROCEDURE — 92526 ORAL FUNCTION THERAPY: CPT | Mod: GN

## 2023-01-01 PROCEDURE — 87637 SARSCOV2&INF A&B&RSV AMP PRB: CPT | Performed by: INTERNAL MEDICINE

## 2023-01-01 PROCEDURE — 97530 THERAPEUTIC ACTIVITIES: CPT | Mod: GP

## 2023-01-01 PROCEDURE — 97535 SELF CARE MNGMENT TRAINING: CPT | Mod: GO

## 2023-01-01 PROCEDURE — 99285 EMERGENCY DEPT VISIT HI MDM: CPT | Mod: 25

## 2023-01-01 PROCEDURE — 36415 COLL VENOUS BLD VENIPUNCTURE: CPT | Performed by: EMERGENCY MEDICINE

## 2023-01-01 PROCEDURE — C9803 HOPD COVID-19 SPEC COLLECT: HCPCS

## 2023-01-01 PROCEDURE — 250N000011 HC RX IP 250 OP 636: Performed by: FAMILY MEDICINE

## 2023-01-01 PROCEDURE — 72125 CT NECK SPINE W/O DYE: CPT

## 2023-01-01 PROCEDURE — 82550 ASSAY OF CK (CPK): CPT | Performed by: INTERNAL MEDICINE

## 2023-01-01 PROCEDURE — 258N000003 HC RX IP 258 OP 636: Performed by: FAMILY MEDICINE

## 2023-01-01 PROCEDURE — 36415 COLL VENOUS BLD VENIPUNCTURE: CPT | Performed by: FAMILY MEDICINE

## 2023-01-01 PROCEDURE — 93005 ELECTROCARDIOGRAM TRACING: CPT | Performed by: EMERGENCY MEDICINE

## 2023-01-01 PROCEDURE — 82310 ASSAY OF CALCIUM: CPT | Performed by: EMERGENCY MEDICINE

## 2023-01-01 PROCEDURE — 99232 SBSQ HOSP IP/OBS MODERATE 35: CPT | Performed by: FAMILY MEDICINE

## 2023-01-01 PROCEDURE — 81001 URINALYSIS AUTO W/SCOPE: CPT | Performed by: EMERGENCY MEDICINE

## 2023-01-01 PROCEDURE — 97162 PT EVAL MOD COMPLEX 30 MIN: CPT | Mod: GP

## 2023-01-01 PROCEDURE — 85027 COMPLETE CBC AUTOMATED: CPT | Performed by: INTERNAL MEDICINE

## 2023-01-01 PROCEDURE — 97110 THERAPEUTIC EXERCISES: CPT | Mod: GP

## 2023-01-01 PROCEDURE — 93306 TTE W/DOPPLER COMPLETE: CPT | Mod: 26 | Performed by: INTERNAL MEDICINE

## 2023-01-01 PROCEDURE — 36415 COLL VENOUS BLD VENIPUNCTURE: CPT | Performed by: INTERNAL MEDICINE

## 2023-01-01 PROCEDURE — 258N000003 HC RX IP 258 OP 636: Performed by: EMERGENCY MEDICINE

## 2023-01-01 PROCEDURE — 97166 OT EVAL MOD COMPLEX 45 MIN: CPT | Mod: GO

## 2023-01-01 PROCEDURE — 85048 AUTOMATED LEUKOCYTE COUNT: CPT | Performed by: EMERGENCY MEDICINE

## 2023-01-01 PROCEDURE — 71275 CT ANGIOGRAPHY CHEST: CPT

## 2023-01-01 PROCEDURE — 87506 IADNA-DNA/RNA PROBE TQ 6-11: CPT | Performed by: INTERNAL MEDICINE

## 2023-01-01 PROCEDURE — 83735 ASSAY OF MAGNESIUM: CPT | Performed by: EMERGENCY MEDICINE

## 2023-01-01 PROCEDURE — 120N000013 HC R&B IMCU

## 2023-01-01 PROCEDURE — 120N000004 HC R&B MS OVERFLOW

## 2023-01-01 PROCEDURE — 87040 BLOOD CULTURE FOR BACTERIA: CPT | Performed by: EMERGENCY MEDICINE

## 2023-01-01 PROCEDURE — 82374 ASSAY BLOOD CARBON DIOXIDE: CPT | Performed by: FAMILY MEDICINE

## 2023-01-01 PROCEDURE — 99204 OFFICE O/P NEW MOD 45 MIN: CPT | Performed by: INTERNAL MEDICINE

## 2023-01-01 PROCEDURE — 84132 ASSAY OF SERUM POTASSIUM: CPT | Performed by: FAMILY MEDICINE

## 2023-01-01 PROCEDURE — 96361 HYDRATE IV INFUSION ADD-ON: CPT

## 2023-01-01 PROCEDURE — 255N000002 HC RX 255 OP 636: Performed by: INTERNAL MEDICINE

## 2023-01-01 PROCEDURE — 999N000208 ECHOCARDIOGRAM COMPLETE

## 2023-01-01 PROCEDURE — 99232 SBSQ HOSP IP/OBS MODERATE 35: CPT | Mod: GC | Performed by: FAMILY MEDICINE

## 2023-01-01 PROCEDURE — 82550 ASSAY OF CK (CPK): CPT | Performed by: FAMILY MEDICINE

## 2023-01-01 PROCEDURE — 99233 SBSQ HOSP IP/OBS HIGH 50: CPT | Performed by: FAMILY MEDICINE

## 2023-01-01 PROCEDURE — 258N000003 HC RX IP 258 OP 636: Performed by: INTERNAL MEDICINE

## 2023-01-01 PROCEDURE — 85379 FIBRIN DEGRADATION QUANT: CPT | Performed by: EMERGENCY MEDICINE

## 2023-01-01 PROCEDURE — 93005 ELECTROCARDIOGRAM TRACING: CPT

## 2023-01-01 PROCEDURE — 85018 HEMOGLOBIN: CPT | Performed by: FAMILY MEDICINE

## 2023-01-01 PROCEDURE — 92610 EVALUATE SWALLOWING FUNCTION: CPT | Mod: GN

## 2023-01-01 PROCEDURE — 96360 HYDRATION IV INFUSION INIT: CPT | Mod: 59

## 2023-01-01 PROCEDURE — 70450 CT HEAD/BRAIN W/O DYE: CPT

## 2023-01-01 PROCEDURE — 99223 1ST HOSP IP/OBS HIGH 75: CPT | Performed by: INTERNAL MEDICINE

## 2023-01-01 RX ORDER — LISINOPRIL 5 MG/1
5 TABLET ORAL DAILY
Qty: 90 TABLET | Refills: 3 | Status: SHIPPED | OUTPATIENT
Start: 2023-01-01 | End: 2023-01-01

## 2023-01-01 RX ORDER — CEFADROXIL 500 MG/1
500 CAPSULE ORAL 2 TIMES DAILY
COMMUNITY

## 2023-01-01 RX ORDER — POTASSIUM CHLORIDE 1500 MG/1
40 TABLET, EXTENDED RELEASE ORAL ONCE
Status: COMPLETED | OUTPATIENT
Start: 2023-01-01 | End: 2023-01-01

## 2023-01-01 RX ORDER — ONDANSETRON 4 MG/1
4 TABLET, ORALLY DISINTEGRATING ORAL EVERY 6 HOURS PRN
Status: DISCONTINUED | OUTPATIENT
Start: 2023-01-01 | End: 2023-01-01 | Stop reason: HOSPADM

## 2023-01-01 RX ORDER — CARBIDOPA AND LEVODOPA 25; 100 MG/1; MG/1
1 TABLET ORAL 3 TIMES DAILY
COMMUNITY

## 2023-01-01 RX ORDER — POLYETHYLENE GLYCOL 3350 17 G/17G
17 POWDER, FOR SOLUTION ORAL DAILY PRN
Status: DISCONTINUED | OUTPATIENT
Start: 2023-01-01 | End: 2023-01-01 | Stop reason: HOSPADM

## 2023-01-01 RX ORDER — LIDOCAINE 4 G/G
1 PATCH TOPICAL
Status: DISCONTINUED | OUTPATIENT
Start: 2023-01-01 | End: 2023-01-01 | Stop reason: HOSPADM

## 2023-01-01 RX ORDER — SODIUM CHLORIDE AND POTASSIUM CHLORIDE 150; 900 MG/100ML; MG/100ML
INJECTION, SOLUTION INTRAVENOUS CONTINUOUS
Status: DISCONTINUED | OUTPATIENT
Start: 2023-01-01 | End: 2023-01-01

## 2023-01-01 RX ORDER — AMOXICILLIN 250 MG
1 CAPSULE ORAL 2 TIMES DAILY PRN
Status: DISCONTINUED | OUTPATIENT
Start: 2023-01-01 | End: 2023-01-01 | Stop reason: HOSPADM

## 2023-01-01 RX ORDER — BISACODYL 10 MG
10 SUPPOSITORY, RECTAL RECTAL DAILY PRN
Status: DISCONTINUED | OUTPATIENT
Start: 2023-01-01 | End: 2023-01-01 | Stop reason: HOSPADM

## 2023-01-01 RX ORDER — SODIUM CHLORIDE 9 MG/ML
INJECTION, SOLUTION INTRAVENOUS CONTINUOUS
Status: DISCONTINUED | OUTPATIENT
Start: 2023-01-01 | End: 2023-01-01

## 2023-01-01 RX ORDER — ACETAMINOPHEN 500 MG
1000 TABLET ORAL EVERY 6 HOURS PRN
Status: DISCONTINUED | OUTPATIENT
Start: 2023-01-01 | End: 2023-01-01 | Stop reason: HOSPADM

## 2023-01-01 RX ORDER — ONDANSETRON 2 MG/ML
4 INJECTION INTRAMUSCULAR; INTRAVENOUS EVERY 6 HOURS PRN
Status: DISCONTINUED | OUTPATIENT
Start: 2023-01-01 | End: 2023-01-01 | Stop reason: HOSPADM

## 2023-01-01 RX ORDER — ACETAMINOPHEN 325 MG/1
650 TABLET ORAL EVERY 6 HOURS PRN
Status: DISCONTINUED | OUTPATIENT
Start: 2023-01-01 | End: 2023-01-01 | Stop reason: HOSPADM

## 2023-01-01 RX ORDER — CEFADROXIL 500 MG/1
500 CAPSULE ORAL 2 TIMES DAILY
Status: DISCONTINUED | OUTPATIENT
Start: 2023-01-01 | End: 2023-01-01 | Stop reason: HOSPADM

## 2023-01-01 RX ORDER — LISINOPRIL 10 MG/1
10 TABLET ORAL DAILY
COMMUNITY
End: 2023-01-01

## 2023-01-01 RX ORDER — CARBIDOPA AND LEVODOPA 25; 100 MG/1; MG/1
1 TABLET ORAL 3 TIMES DAILY
Status: DISCONTINUED | OUTPATIENT
Start: 2023-01-01 | End: 2023-01-01 | Stop reason: HOSPADM

## 2023-01-01 RX ORDER — LISINOPRIL 10 MG/1
10 TABLET ORAL DAILY
Status: DISCONTINUED | OUTPATIENT
Start: 2023-01-01 | End: 2023-01-01 | Stop reason: HOSPADM

## 2023-01-01 RX ORDER — LIDOCAINE 40 MG/G
CREAM TOPICAL
Status: DISCONTINUED | OUTPATIENT
Start: 2023-01-01 | End: 2023-01-01 | Stop reason: HOSPADM

## 2023-01-01 RX ORDER — ACETAMINOPHEN 650 MG/1
650 SUPPOSITORY RECTAL EVERY 6 HOURS PRN
Status: DISCONTINUED | OUTPATIENT
Start: 2023-01-01 | End: 2023-01-01 | Stop reason: HOSPADM

## 2023-01-01 RX ORDER — UBIDECARENONE 100 MG
100 CAPSULE ORAL DAILY
COMMUNITY

## 2023-01-01 RX ORDER — AMOXICILLIN 250 MG
2 CAPSULE ORAL 2 TIMES DAILY PRN
Status: DISCONTINUED | OUTPATIENT
Start: 2023-01-01 | End: 2023-01-01 | Stop reason: HOSPADM

## 2023-01-01 RX ORDER — IOPAMIDOL 755 MG/ML
80 INJECTION, SOLUTION INTRAVASCULAR ONCE
Status: COMPLETED | OUTPATIENT
Start: 2023-01-01 | End: 2023-01-01

## 2023-01-01 RX ORDER — POTASSIUM CHLORIDE 1500 MG/1
20 TABLET, EXTENDED RELEASE ORAL ONCE
Status: COMPLETED | OUTPATIENT
Start: 2023-01-01 | End: 2023-01-01

## 2023-01-01 RX ADMIN — POTASSIUM CHLORIDE 40 MEQ: 1500 TABLET, EXTENDED RELEASE ORAL at 08:47

## 2023-01-01 RX ADMIN — CEFADROXIL 500 MG: 500 CAPSULE ORAL at 09:13

## 2023-01-01 RX ADMIN — CARBIDOPA AND LEVODOPA 1 TABLET: 25; 100 TABLET ORAL at 20:29

## 2023-01-01 RX ADMIN — LISINOPRIL 10 MG: 10 TABLET ORAL at 08:19

## 2023-01-01 RX ADMIN — CEFADROXIL 500 MG: 500 CAPSULE ORAL at 21:06

## 2023-01-01 RX ADMIN — LISINOPRIL 10 MG: 10 TABLET ORAL at 08:24

## 2023-01-01 RX ADMIN — CEFADROXIL 500 MG: 500 CAPSULE ORAL at 08:20

## 2023-01-01 RX ADMIN — CARBIDOPA AND LEVODOPA 1 TABLET: 25; 100 TABLET ORAL at 14:09

## 2023-01-01 RX ADMIN — SODIUM CHLORIDE: 9 INJECTION, SOLUTION INTRAVENOUS at 03:13

## 2023-01-01 RX ADMIN — POTASSIUM CHLORIDE 20 MEQ: 1500 TABLET, EXTENDED RELEASE ORAL at 08:24

## 2023-01-01 RX ADMIN — CARBIDOPA AND LEVODOPA 1 TABLET: 25; 100 TABLET ORAL at 21:06

## 2023-01-01 RX ADMIN — SODIUM CHLORIDE: 9 INJECTION, SOLUTION INTRAVENOUS at 23:33

## 2023-01-01 RX ADMIN — CARBIDOPA AND LEVODOPA 1 TABLET: 25; 100 TABLET ORAL at 08:31

## 2023-01-01 RX ADMIN — PERFLUTREN 2 ML: 6.52 INJECTION, SUSPENSION INTRAVENOUS at 13:30

## 2023-01-01 RX ADMIN — CEFADROXIL 500 MG: 500 CAPSULE ORAL at 08:47

## 2023-01-01 RX ADMIN — SODIUM CHLORIDE: 9 INJECTION, SOLUTION INTRAVENOUS at 01:59

## 2023-01-01 RX ADMIN — CEFADROXIL 500 MG: 500 CAPSULE ORAL at 20:57

## 2023-01-01 RX ADMIN — CEFADROXIL 500 MG: 500 CAPSULE ORAL at 19:32

## 2023-01-01 RX ADMIN — CARBIDOPA AND LEVODOPA 1 TABLET: 25; 100 TABLET ORAL at 13:17

## 2023-01-01 RX ADMIN — POTASSIUM CHLORIDE AND SODIUM CHLORIDE: 900; 150 INJECTION, SOLUTION INTRAVENOUS at 15:28

## 2023-01-01 RX ADMIN — CARBIDOPA AND LEVODOPA 1 TABLET: 25; 100 TABLET ORAL at 14:55

## 2023-01-01 RX ADMIN — SODIUM CHLORIDE: 9 INJECTION, SOLUTION INTRAVENOUS at 12:56

## 2023-01-01 RX ADMIN — POTASSIUM CHLORIDE 40 MEQ: 1500 TABLET, EXTENDED RELEASE ORAL at 07:02

## 2023-01-01 RX ADMIN — SODIUM CHLORIDE: 9 INJECTION, SOLUTION INTRAVENOUS at 11:26

## 2023-01-01 RX ADMIN — SODIUM CHLORIDE 1000 ML: 9 INJECTION, SOLUTION INTRAVENOUS at 05:06

## 2023-01-01 RX ADMIN — CARBIDOPA AND LEVODOPA 1 TABLET: 25; 100 TABLET ORAL at 19:32

## 2023-01-01 RX ADMIN — LISINOPRIL 10 MG: 10 TABLET ORAL at 08:31

## 2023-01-01 RX ADMIN — CARBIDOPA AND LEVODOPA 1 TABLET: 25; 100 TABLET ORAL at 14:24

## 2023-01-01 RX ADMIN — CEFADROXIL 500 MG: 500 CAPSULE ORAL at 08:24

## 2023-01-01 RX ADMIN — SODIUM CHLORIDE: 9 INJECTION, SOLUTION INTRAVENOUS at 18:30

## 2023-01-01 RX ADMIN — CEFADROXIL 500 MG: 500 CAPSULE ORAL at 20:29

## 2023-01-01 RX ADMIN — CARBIDOPA AND LEVODOPA 1 TABLET: 25; 100 TABLET ORAL at 08:19

## 2023-01-01 RX ADMIN — CARBIDOPA AND LEVODOPA 1 TABLET: 25; 100 TABLET ORAL at 13:32

## 2023-01-01 RX ADMIN — CARBIDOPA AND LEVODOPA 1 TABLET: 25; 100 TABLET ORAL at 20:57

## 2023-01-01 RX ADMIN — CARBIDOPA AND LEVODOPA 1 TABLET: 25; 100 TABLET ORAL at 14:50

## 2023-01-01 RX ADMIN — CEFADROXIL 500 MG: 500 CAPSULE ORAL at 19:42

## 2023-01-01 RX ADMIN — SODIUM CHLORIDE: 9 INJECTION, SOLUTION INTRAVENOUS at 11:24

## 2023-01-01 RX ADMIN — CEFADROXIL 500 MG: 500 CAPSULE ORAL at 11:45

## 2023-01-01 RX ADMIN — CARBIDOPA AND LEVODOPA 1 TABLET: 25; 100 TABLET ORAL at 08:24

## 2023-01-01 RX ADMIN — CEFADROXIL 500 MG: 500 CAPSULE ORAL at 08:31

## 2023-01-01 RX ADMIN — IOPAMIDOL 80 ML: 755 INJECTION, SOLUTION INTRAVENOUS at 06:42

## 2023-01-01 RX ADMIN — CARBIDOPA AND LEVODOPA 1 TABLET: 25; 100 TABLET ORAL at 08:47

## 2023-01-01 RX ADMIN — CARBIDOPA AND LEVODOPA 1 TABLET: 25; 100 TABLET ORAL at 19:42

## 2023-01-01 RX ADMIN — CARBIDOPA AND LEVODOPA 1 TABLET: 25; 100 TABLET ORAL at 09:13

## 2023-01-01 RX ADMIN — LISINOPRIL 10 MG: 10 TABLET ORAL at 08:47

## 2023-01-01 RX ADMIN — POTASSIUM CHLORIDE 40 MEQ: 1500 TABLET, EXTENDED RELEASE ORAL at 15:25

## 2023-01-01 ASSESSMENT — ACTIVITIES OF DAILY LIVING (ADL)
ADLS_ACUITY_SCORE: 21
ADLS_ACUITY_SCORE: 21
ADLS_ACUITY_SCORE: 27
PREVIOUS_RESPONSIBILITIES: YARDWORK
ADLS_ACUITY_SCORE: 21
ADLS_ACUITY_SCORE: 27
DEPENDENT_IADLS:: INDEPENDENT
ADLS_ACUITY_SCORE: 21
FALL_HISTORY_WITHIN_LAST_SIX_MONTHS: YES
ADLS_ACUITY_SCORE: 21
ADLS_ACUITY_SCORE: 35
ADLS_ACUITY_SCORE: 27
ADLS_ACUITY_SCORE: 21
ADLS_ACUITY_SCORE: 21
ADLS_ACUITY_SCORE: 27
HEARING_DIFFICULTY_OR_DEAF: NO
ADLS_ACUITY_SCORE: 21
ADLS_ACUITY_SCORE: 38
ADLS_ACUITY_SCORE: 21
ADLS_ACUITY_SCORE: 21
ADLS_ACUITY_SCORE: 27
ADLS_ACUITY_SCORE: 27
ADLS_ACUITY_SCORE: 35
NUMBER_OF_TIMES_PATIENT_HAS_FALLEN_WITHIN_LAST_SIX_MONTHS: 3
ADLS_ACUITY_SCORE: 21
ADLS_ACUITY_SCORE: 21
ADLS_ACUITY_SCORE: 35
ADLS_ACUITY_SCORE: 27
ADLS_ACUITY_SCORE: 21
ADLS_ACUITY_SCORE: 27
ADLS_ACUITY_SCORE: 21
ADLS_ACUITY_SCORE: 21
ADLS_ACUITY_SCORE: 27
DIFFICULTY_COMMUNICATING: NO
ADLS_ACUITY_SCORE: 27
ADLS_ACUITY_SCORE: 35
ADLS_ACUITY_SCORE: 21
ADLS_ACUITY_SCORE: 27
ADLS_ACUITY_SCORE: 27
ADLS_ACUITY_SCORE: 35
DIFFICULTY_EATING/SWALLOWING: NO
ADLS_ACUITY_SCORE: 27
ADLS_ACUITY_SCORE: 36
CONCENTRATING,_REMEMBERING_OR_MAKING_DECISIONS_DIFFICULTY: NO
ADLS_ACUITY_SCORE: 35
ADLS_ACUITY_SCORE: 44
ADLS_ACUITY_SCORE: 21
ADLS_ACUITY_SCORE: 27
CHANGE_IN_FUNCTIONAL_STATUS_SINCE_ONSET_OF_CURRENT_ILLNESS/INJURY: YES
ADLS_ACUITY_SCORE: 27
ADLS_ACUITY_SCORE: 21
WEAR_GLASSES_OR_BLIND: NO
ADLS_ACUITY_SCORE: 27
ADLS_ACUITY_SCORE: 35
ADLS_ACUITY_SCORE: 21
ADLS_ACUITY_SCORE: 25
ADLS_ACUITY_SCORE: 35
ADLS_ACUITY_SCORE: 21
ADLS_ACUITY_SCORE: 21
DOING_ERRANDS_INDEPENDENTLY_DIFFICULTY: NO
DRESSING/BATHING_DIFFICULTY: NO
ADLS_ACUITY_SCORE: 27
WALKING_OR_CLIMBING_STAIRS_DIFFICULTY: NO
ADLS_ACUITY_SCORE: 27
ADLS_ACUITY_SCORE: 21
ADLS_ACUITY_SCORE: 29
ADLS_ACUITY_SCORE: 21
ADLS_ACUITY_SCORE: 29
ADLS_ACUITY_SCORE: 27
TOILETING_ISSUES: NO
ADLS_ACUITY_SCORE: 35

## 2023-01-01 ASSESSMENT — ENCOUNTER SYMPTOMS
VOMITING: 0
NAUSEA: 0

## 2023-02-24 PROBLEM — M62.81 GENERALIZED MUSCLE WEAKNESS: Status: ACTIVE | Noted: 2023-01-01

## 2023-02-24 PROBLEM — R00.0 TACHYCARDIA: Status: ACTIVE | Noted: 2023-01-01

## 2023-02-24 PROBLEM — R55 SYNCOPE, UNSPECIFIED SYNCOPE TYPE: Status: ACTIVE | Noted: 2023-01-01

## 2023-02-24 NOTE — H&P
[facility]    History and Physical - Hospitalist Service       Date of Admission:  2/24/2023    Assessment & Plan      Javier Carrillo is a 88 year old male with history of Parkinson's disease, orthostatic hypotension, chronic kidney disease stage III, frequent falls, gastroesophageal reflux disease, essential hypertension, hypertriglyceridemia, admitted on 2/24/2023 for nausea, vomiting, diarrhea, syncopal episode and fall in his home.     Fall initial encounter  Syncope, orthostatic  Elevated D-dimer  Nausea vomiting, diarrhea  Chronic muscle weakness, gait instability.   Rhabdomyolysis, mild.   History of Orthostatic hypotension.  Total ck was elevated >1,000 on admission.  Cardiac monitoring.   Order transthoracic echocardiogram  Order fall and aspiration precautions.   Order PT, OT, Speech evaluation.  Order normal saline 125 mL/hr.   Orthostatic vital signs in a.m.  Order ondansetron 4 mg IV every 6 hours as needed  Order FLUVID PCR screen  Order potassium magnesium replacement protocol RN managed.  Order enteric PCR panel.   If recurrent diarrhea will order C.diff PCR.   Repeat total CK in a.m.    Left later knee pressure injury present prior to arrival.   Order WOC consultation.     Incidental finding of nondisplaced 12th rib fracture.   Lidocaine patch, tylenol 650 mg po q4h prn for pain.     Essential hypertension  Hold lisinopril 10 mg daily    Chronic knee arthroplasty infection  On chronic suppressive therapy  Continue cefadroxil 500mg po BID    Parkinson's disease  Order Sinemet  mg 1 tab 3 times daily    Gastroesophageal reflux disease  PTA medication: Omeprazole 20 mg daily as needed    Chronic kidney disease stage III:   Repeat base metabolic profile in a.m.    Hypertriglyceridemia-not currently on medical management.     Diet: Combination Diet Regular Diet Adult  DVT Prophylaxis: Pneumatic Compression Devices  East Catheter: Not present  Lines: None     Cardiac  Monitoring: ACTIVE order. Indication: Syncope- low cardiac risk (24 hours)  Code Status: FULL    Clinically Significant Risk Factors Present on Admission                  # Hypertension: home medication list includes antihypertensive(s)              Disposition Plan      Expected Discharge Date: 02/26/2023                  Jeromy Smith DO  Hospitalist Service  [facility]  Securely message with [employer] (more info)  Text page via [employer] Paging/Directory     ______________________________________________________________________    Chief Complaint   Nausea, vomiting, weakness, fall, syncope, orthostatic lightheadedness.    History is obtained from the patient/family.    History of Present Illness   Javier Carrillo is a 88 year old male with history of Parkinson's disease, frequent falls, essential hypertension, hypertriglyceridemia, presented to [facility] on 2/24/2023 for nausea, vomiting, diarrhea and fall in his home.  Patient underwent outpatient physical therapy for muscle weakness and difficulty walking.    In the emergency room patient's vital signs remarkable for tachycardia with heart rate of 122 bpm.  Blood pressure was elevated between 130 and 166 mmHg systolic.  Patient was initially on 95% on room air for some reason placed on 3 L/min via nasal cannula still saturating 95%.  Laboratory findings were remarkable for carbon dioxide of 21, urea nitrogen 32.  Creatinine was 1.22, GFR 57.  Anion gap elevated at 16.  Glucose was elevated 148 mg/dL.  CBC remarkable for leukocytosis of 21.2.  Hemoglobin was decreased to 12.5 platelet count mildly elevated at 473.  Differential is predominantly neutrophils with 0.3 absolute lymphocytes.  D-dimer elevated at 2.7.  CT chest pulmonary embolism with contrast showed no pulmonary embolus.  There is an acute nondisplaced fracture of the right 12th rib and evidence of coronary artery disease.  CT head and cervical spine with no evidence  "of acute intracranial process, fracture or posttraumatic subluxation.  EKG revealed sinus tachycardia without acute ST or T wave changes.  No significant change from March 2020 EKG.      Past Medical History    Past Medical History:   Diagnosis Date     Acute hyponatremia      GUSTAVO (acute kidney injury) (H)      Chronic kidney disease     \"stage 3 kidney\"     GERD (gastroesophageal reflux disease)      Hearing loss      History of transfusion      HTN (hypertension)      Hypertriglyceridemia      Melanoma (H) 05/2015    Rt Bicep     Septicemia (H)      SVT (supraventricular tachycardia) (H) 1999     Traumatic rhabdomyolysis (H)        Past Surgical History   Past Surgical History:   Procedure Laterality Date     CATARACT EXTRACTION Bilateral      JOINT REPLACEMENT       OTHER SURGICAL HISTORY Left     Laser retinal surgery     OTHER SURGICAL HISTORY Left 03/10/2020    IRRIGATION AND DEBRIDEMENT KNEE ARTHROPLASTY/ Poly Exchange      FL ESOPHAGOGASTRODUODENOSCOPY TRANSORAL DIAGNOSTIC N/A 3/19/2020    Procedure: ESOPHAGOGASTRODUODENOSCOPY (EGD);  Surgeon: Joby Brice MD;  [facility]: [facility];  Service: Gastroenterology     REPLACEMENT TOTAL KNEE Left      RETINAL DETACHMENT SURGERY Right 1980     TOTAL KNEE ARTHROPLASTY Right        Prior to Admission Medications   Prior to Admission Medications   Prescriptions Last Dose Informant Patient Reported? Taking?   acetaminophen (TYLENOL) 500 MG tablet Past Month  Yes Yes   Sig: [ACETAMINOPHEN (TYLENOL) 500 MG TABLET] Take 1,000 mg by mouth every 6 (six) hours as needed for pain.   calcium carbonate-vitamin D3 (OS-DEVON 250+ D) 250-125 mg-unit Tab per tablet 2/23/2023  Yes Yes   Sig: [CALCIUM CARBONATE-VITAMIN D3 (OS-DEVON 250+ D) 250-125 MG-UNIT TAB PER TABLET] Take 1 tablet by mouth daily.   calcium, as carbonate, (TUMS) 200 mg calcium (500 mg) chewable tablet Past Week  Yes Yes   Sig: [CALCIUM, AS CARBONATE, (TUMS) 200 MG CALCIUM (500 MG) CHEWABLE TABLET] Chew 2 " tablets every 2 (two) hours as needed for heartburn.    carbidopa-levodopa (SINEMET)  MG tablet 2/23/2023  Yes Yes   Sig: Take 1 tablet by mouth 3 times daily   cefadroxil (DURICEF) 500 MG capsule 2/23/2023  Yes Yes   Sig: Take 500 mg by mouth 2 times daily   co-enzyme Q-10 100 MG CAPS capsule 2/23/2023  Yes Yes   Sig: Take 100 mg by mouth daily   lisinopril (ZESTRIL) 10 MG tablet 2/23/2023  Yes Yes   Sig: Take 10 mg by mouth daily   melatonin 3 mg Tab tablet Past Month  Yes Yes   Sig: Take 3 mg by mouth nightly as needed   omeprazole (PRILOSEC) 20 MG DR capsule Past Week  Yes Yes   Sig: Take 20 mg by mouth daily as needed      Facility-Administered Medications: None        Review of Systems    The 10 point Review of Systems is negative other than noted in the HPI.    Social History   I have reviewed this patient's social history and updated it with pertinent information if needed.  Social History     Tobacco Use     Smoking status: Never     Smokeless tobacco: Never   Substance Use Topics     Alcohol use: No     Drug use: No       Family History   I have reviewed this patient's family history and updated it with pertinent information if needed.  Family History   Problem Relation Age of Onset     Heart Failure Sister 80.00     Pancreatic Cancer Brother        Allergies   Allergies   Allergen Reactions     Levaquin [Levofloxacin] Unknown     Tendon pain        Physical Exam   Vital Signs: Temp: 98.9  F (37.2  C) Temp src: Oral BP: 128/68 Pulse: (!) 124   Resp: 18 SpO2: 97 % O2 Device: None (Room air) Oxygen Delivery: 3 LPM  Weight: 0 lbs 0 oz    Constitutional: awake, frail  Elderly male, alert, cooperative, no apparent distress, and appears stated age  Eyes: Lids and lashes normal, pupils equal, round and reactive to light, extra ocular muscles intact, sclera clear, conjunctiva normal.  ENT: Normocephalic, without obvious abnormality, atraumatic, sinuses nontender on palpation, external ears without lesions,  oral pharynx with dry mucous membranes, gums normal and good dentition.  Hematologic / Lymphatic: no cervical lymphadenopathy and no supraclavicular lymphadenopathy  Respiratory: No increased work of breathing, good air exchange, clear to auscultation bilaterally, no crackles or wheezing  Cardiovascular: Normal apical impulse, tachycardia, normal S1 and S2, no S3 or S4, and no murmur noted  GI: No scars, normal bowel sounds, soft, non-distended, non-tender, no masses palpated, no hepatosplenomegally  Skin: no bruising or bleeding, normal skin color, texture, turgor, pressureinjury left lateral knee.  Musculoskeletal: There is no redness, warmth, or swelling of the joints.  Full range of motion noted.  Motor strength is 5 out of 5 all extremities bilaterally.  Tone is normal.  Mild cogwheel rigidity.  Neurologic: Awake, alert, oriented to name, place and time.  Motor is 5 out of 5 bilaterally.  Cerebellar finger to nose, heel to shin intact.  Sensory is intact.  Babinski down going.  Neuropsychiatric: General: normal, calm and normal eye contact  Level of consciousness: alert / normal    Medical Decision Making   76 MINUTES SPENT BY ME on the date of service doing chart review, history, exam, documentation & further activities per the note.      Data     I have personally reviewed the following data over the past 24 hrs:    21.2 (H)  \   12.5 (L)   / 473 (H)     139 102 32 (H) /  148 (H)   4.2 21 (L) 1.22 \       Procal: N/A CRP: N/A Lactic Acid: 1.4       INR:  N/A PTT:  N/A   D-dimer:  2.70 (H) Fibrinogen:  N/A       Imaging results reviewed over the past 24 hrs:   Recent Results (from the past 24 hour(s))   CT Head w/o Contrast    Narrative    EXAM: CT HEAD W/O CONTRAST  LOCATION: [facility]  DATE/TIME: 2/24/2023 5:49 AM    INDICATION: Fall. Head injury.  COMPARISON: None.  TECHNIQUE: Routine CT Head without IV contrast. Multiplanar reformats. Dose reduction techniques were  used.    FINDINGS:  INTRACRANIAL CONTENTS: No intracranial hemorrhage, extraaxial collection, or mass effect.  No CT evidence of acute infarct. Mild presumed chronic small vessel ischemic changes. Mild to moderate generalized volume loss. No hydrocephalus.     Note is made of coarse atherosclerotic calcifications of the intracranial vasculature.       VISUALIZED ORBITS/SINUSES/MASTOIDS: Prior bilateral cataract surgery. Visualized portions of the orbits are otherwise unremarkable. Left maxillary sinus air-fluid level. No middle ear or mastoid effusion.    BONES/SOFT TISSUES: No acute abnormality.      Impression    IMPRESSION:  1.  No CT evidence for acute intracranial process.  2.  Brain atrophy and presumed chronic microvascular ischemic changes as above.  3.  Nonspecific left maxillary sinus air-fluid level. This finding may be related to trauma or acute sinusitis.                     CT Cervical Spine w/o Contrast    Narrative    EXAM: CT CERVICAL SPINE W/O CONTRAST  LOCATION: [facility]  DATE/TIME: 2/24/2023 5:50 AM    INDICATION: Fall. Traumatic injury.  COMPARISON: None.  TECHNIQUE: Routine CT Cervical Spine without IV contrast. Multiplanar reformats. Dose reduction techniques were used.    FINDINGS:  VERTEBRA: Decreased osseous mineralization. Normal cervical spine vertebral body heights and alignment. No fracture or posttraumatic subluxation.     CANAL/FORAMINA: Multilevel degenerative changes. No high-grade central spinal canal stenosis. At C3-C4 there is severe left and mild to moderate right neural foraminal stenosis. At C4-C5 there is severe right and mild to moderate left neural foraminal   stenosis. At C5-C6 there is severe bilateral neural foraminal stenosis. At C6-C7 there is moderate right and moderate to severe left neural foraminal stenosis.    PARASPINAL: No extraspinal abnormality.      Impression    IMPRESSION:  1.  No fracture or posttraumatic subluxation.  2.   Degenerative changes, as above.                CT Chest Pulmonary Embolism w Contrast    Narrative    EXAM: CT CHEST PULMONARY EMBOLISM W CONTRAST  LOCATION: [facility]  DATE/TIME: 2/24/2023 6:41 AM    INDICATION: syncope  COMPARISON: Chest radiograph 03/18/2020. CT abdomen 03/18/2020.  TECHNIQUE: CT chest pulmonary angiogram during arterial phase injection of IV contrast. Multiplanar reformats and MIP reconstructions were performed. Dose reduction techniques were used.   CONTRAST: [id number] 80ml    FINDINGS:  ANGIOGRAM CHEST: No aortic dissection or aneurysm. No pulmonary embolus.    LUNGS AND PLEURA: Mild dependent and basilar atelectasis. No pleural fluid or pneumothorax.    MEDIASTINUM/AXILLAE: Normal.    CORONARY ARTERY CALCIFICATION: Severe.    UPPER ABDOMEN: Normal.    MUSCULOSKELETAL: Acute nondisplaced fracture of the medial right 12th rib. Mild degenerative changes of the spine.      Impression    IMPRESSION:  1.  No pulmonary embolus.  2.  Acute nondisplaced fracture of the right 12th rib.  3.  Coronary artery disease.

## 2023-02-24 NOTE — PHARMACY-ADMISSION MEDICATION HISTORY
Pharmacy Note - Admission Medication History    Pertinent Provider Information:      ______________________________________________________________________    Prior To Admission (PTA) med list completed and updated in EMR.       PTA Med List   Medication Sig Note Last Dose     acetaminophen (TYLENOL) 500 MG tablet [ACETAMINOPHEN (TYLENOL) 500 MG TABLET] Take 1,000 mg by mouth every 6 (six) hours as needed for pain.  Past Month     calcium carbonate-vitamin D3 (OS-DEVON 250+ D) 250-125 mg-unit Tab per tablet [CALCIUM CARBONATE-VITAMIN D3 (OS-DEVON 250+ D) 250-125 MG-UNIT TAB PER TABLET] Take 1 tablet by mouth daily.  2/23/2023     calcium, as carbonate, (TUMS) 200 mg calcium (500 mg) chewable tablet [CALCIUM, AS CARBONATE, (TUMS) 200 MG CALCIUM (500 MG) CHEWABLE TABLET] Chew 2 tablets every 2 (two) hours as needed for heartburn.   Past Week     carbidopa-levodopa (SINEMET)  MG tablet Take 1 tablet by mouth 3 times daily  2/23/2023     cefadroxil (DURICEF) 500 MG capsule Take 500 mg by mouth 2 times daily 2/24/2023: Chronic antibiotic 2/23/2023     co-enzyme Q-10 100 MG CAPS capsule Take 100 mg by mouth daily  2/23/2023     lisinopril (ZESTRIL) 10 MG tablet Take 10 mg by mouth daily  2/23/2023     melatonin 3 mg Tab tablet Take 3 mg by mouth nightly as needed  Past Month     omeprazole (PRILOSEC) 20 MG DR capsule Take 20 mg by mouth daily as needed  Past Week       Information source(s): Patient, Family member and CareEverywhere/[facility]  Method of interview communication: in-person    Summary of Changes to PTA Med List  New: Carbadopa/levodopa, cefadroxil, CoQ10  Discontinued: tramadol, pantoprazole, amlodipine  Changed: lisinopril decreased from 20 mg    Patient was asked about OTC/herbal products specifically.  PTA med list reflects this.    In the past week, patient estimated taking medication this percent of the time:  greater than 90%.    Medication Affordability:       Allergies were reviewed, assessed,  and updated with the patient.      Patient does not use any multi-dose medications prior to admission.    The information provided in this note is only as accurate as the sources available at the time of the update(s).    Thank you for the opportunity to participate in the care of this patient.    Augustin Hui [facility]  2/24/2023 7:55 AM

## 2023-02-24 NOTE — ED PROVIDER NOTES
EMERGENCY DEPARTMENT ENCOUNTER      NAME: Javier Carrillo  AGE: 88 year old male  YOB: 1934  MRN: [id number]  EVALUATION DATE & TIME: 2/24/2023  4:45 AM    PCP: Pancho Nunes    ED PROVIDER: Cleveland Thompson M.D.      Chief Complaint   Patient presents with     Fall     Patient arrive with ems from home stated fell at midnight while trying to use the toilet and at 3:00 am c/o of vomiting with 5 min of LOC. Patient stated he did not hit his head. Patient was given zofran and fluids, blood sugar 170, sinus tachy, room air 95%. Patient is alert and oriented x 4          FINAL IMPRESSION:  1. Syncope, unspecified syncope type    2. Generalized muscle weakness    3. Tachycardia          ED COURSE & MEDICAL DECISION MAKING:    Pertinent Labs & Imaging studies reviewed. (See chart for details)  88 year old male presents to the Emergency Department for evaluation of fall.  Sounds that he may have had a syncopal episode.  There is a period that who is unresponsive.  No seizure activity noted.  Due to a head CT.  There is no signs of intracranial hemorrhage.  No mass.  Neck CT is negative.  Patient is tachycardic.  EKG does not show anything other than tachycardia.  Troponin is negative.  White count is elevated.  Did do a CT scan of his chest given elevated D-dimer.  This is negative for PE.  No infiltrate.  Unclear what caused his weakness.  Has been having some vomiting and diarrhea.  Could be GI illness.  Abdomen is nontender.  I will think he needs abdominal CT.  Lactic and blood cultures are drawn and pending.  As is the urinalysis.  Given his weakness and likely syncope patient be admitted for further care.  Given IV fluids to help with his tachycardia.  Discussed with Dr. Smith for who accepted admit    4:47 AM I met with the patient to gather history and to perform my initial exam. I discussed the plan for care while in the Emergency Department. PPE: surgical mask and gloves    5:51 AM I met  with the patient's son and daughter in law to gather more history     At the conclusion of the encounter I discussed the results of all of the tests and the disposition. The questions were answered. The patient or family acknowledged understanding and was agreeable with the care plan.     Medical Decision Making    History:    Supplemental history from: Documented in chart, if applicable, Family Member/Significant Other and EMS    External Record(s) reviewed: Documented in chart, if applicable.    Work Up:    Chart documentation includes differential considered and any EKGs or imaging independently interpreted by provider, where specified.    In additional to work up documented, I considered the following work up: Documented in chart, if applicable.    External consultation:    Discussion of management with another provider: Documented in chart, if applicable    Complicating factors:    Care impacted by chronic illness: Hyperlipidemia, Hypertension and Other: Parkinson's Disease, and Chronic Kidney Disease    Care affected by social determinants of health: N/A    Disposition considerations: Admit.            MEDICATIONS GIVEN IN THE EMERGENCY:  Medications   0.9% sodium chloride BOLUS (1,000 mLs Intravenous $New Bag 2/24/23 8021)   iopamidol (ISOVUE-370) solution 80 mL (80 mLs Intravenous $Given 2/24/23 0673)       NEW PRESCRIPTIONS STARTED AT TODAY'S ER VISIT  New Prescriptions    No medications on file          =================================================================    HPI    Patient information was obtained from: EMS, patient, and patient's family     Use of : N/A      Javier Carrillo is a 88 year old male with a pertinent history of Parkinson's Disease, CKD 3, traumatic rhabdomyolysis, SVT, anemia, orthostatic hypotension, HTN, and HLD who presents to this ED via EMS for evaluation of a fall    Per EMS: The patient fell around midnight (about 5 hours ago) and was found by his wife. The  "patient's daughter-in-law arrived at the patient's house around 3 AM and called EMS. The patient was found to be tachycardic with a blood glucose of 170. EMS also states that the patient was \"super super pale\" when he was found, but his color is starting to come back now. The patient's oxygen sats were 94-95%. The patient also endorsed nausea and vomiting.     Per patient's family: The patient's wife found him around midnight on the bathroom floor surrounded by diarrhea. She tried to get the patient up and was unsuccessful, so she called the patient's daughter in law and son around 3:30 AM. They tried to get the patient onto the toilet, but the patient \"had no energy at all\" and was unable to make it to the toilet before having another loss of bowel and a couple episodes of emesis. They called EMS for the patient because though the patient has had episodes of weakness in the past, this time it was \"pretty extreme\". The patient's daughter in law states that yesterday, the patient was \"doing stuff well\" and plowed the driveway on their tractor. Now they state, the patient \"can't even stand\". They also note that the patient does not have a walker at home and they would like for the patient to have one.     Per patient: The patient reports he \"got tired\" and fell. The patient denies hitting their head or loss of consciousness. The patient denies nausea, vomiting, vision changes, or problems with bowel movements or urination. The patient denies any pain and states that, \"nothing hurts\". The patient normally walks with a cane.     REVIEW OF SYSTEMS   Review of Systems   Eyes: Negative for visual disturbance.   Gastrointestinal: Negative for nausea and vomiting.   Genitourinary: Negative.    Neurological: Negative for syncope.        PAST MEDICAL HISTORY:  Past Medical History:   Diagnosis Date     Acute hyponatremia      GUSTAVO (acute kidney injury) (H)      Chronic kidney disease     \"stage 3 kidney\"     GERD " (gastroesophageal reflux disease)      Hearing loss      History of transfusion      HTN (hypertension)      Hypertriglyceridemia      Melanoma (H) 05/2015    Rt Bicep     Septicemia (H)      SVT (supraventricular tachycardia) (H) 1999     Traumatic rhabdomyolysis (H)        PAST SURGICAL HISTORY:  Past Surgical History:   Procedure Laterality Date     CATARACT EXTRACTION Bilateral      JOINT REPLACEMENT       OTHER SURGICAL HISTORY Left     Laser retinal surgery     OTHER SURGICAL HISTORY Left 03/10/2020    IRRIGATION AND DEBRIDEMENT KNEE ARTHROPLASTY/ Poly Exchange      WI ESOPHAGOGASTRODUODENOSCOPY TRANSORAL DIAGNOSTIC N/A 3/19/2020    Procedure: ESOPHAGOGASTRODUODENOSCOPY (EGD);  Surgeon: Joby Brice MD;  Location: [facility];  Service: Gastroenterology     REPLACEMENT TOTAL KNEE Left      RETINAL DETACHMENT SURGERY Right 1980     TOTAL KNEE ARTHROPLASTY Right            CURRENT MEDICATIONS:    No current facility-administered medications for this encounter.     Current Outpatient Medications   Medication     acetaminophen (TYLENOL) 500 MG tablet     amLODIPine (NORVASC) 5 MG tablet     calcium carbonate-vitamin D3 (OS-EDVON 250+ D) 250-125 mg-unit Tab per tablet     calcium, as carbonate, (TUMS) 200 mg calcium (500 mg) chewable tablet     lisinopril (PRINIVIL,ZESTRIL) 20 MG tablet     melatonin 3 mg Tab tablet     omeprazole (PRILOSEC) 20 MG capsule     pantoprazole (PROTONIX) 40 MG tablet     traMADoL (ULTRAM) 50 mg tablet         ALLERGIES:  Allergies   Allergen Reactions     Levaquin [Levofloxacin] Unknown     Tendon pain       FAMILY HISTORY:  Family History   Problem Relation Age of Onset     Heart Failure Sister 80.00     Pancreatic Cancer Brother        SOCIAL HISTORY:   Social History     Socioeconomic History     Marital status:    Tobacco Use     Smoking status: Never     Smokeless tobacco: Never   Substance and Sexual Activity     Alcohol use: No     Drug use: No     Sexual activity:  Never       VITALS:  BP (!) 155/73   Pulse 118   Temp 97.9  F (36.6  C) (Oral)   Resp 15   SpO2 95%     PHYSICAL EXAM    Physical Exam  Vitals and nursing note reviewed.   Constitutional:       General: He is not in acute distress.     Appearance: He is not diaphoretic.   HENT:      Head: Atraumatic.   Eyes:      General: No scleral icterus.     Pupils: Pupils are equal, round, and reactive to light.   Cardiovascular:      Rate and Rhythm: Regular rhythm. Tachycardia present.      Heart sounds: Normal heart sounds.   Pulmonary:      Effort: No respiratory distress.      Breath sounds: Normal breath sounds.   Abdominal:      Palpations: Abdomen is soft.      Tenderness: There is no abdominal tenderness.   Musculoskeletal:         General: No tenderness.   Lymphadenopathy:      Cervical: No cervical adenopathy.   Skin:     General: Skin is warm.      Findings: No rash.   Neurological:      General: No focal deficit present.      Mental Status: He is oriented to person, place, and time.      Comments: 5 out of 5 strength in bilateral upper and lower extremities.  Sensation intact in all 4 extremes.  Cranial nerves intact.  No pronator drift  cogwheeling noted             LAB:  All pertinent labs reviewed and interpreted.  Labs Ordered and Resulted from Time of ED Arrival to Time of ED Departure   BASIC METABOLIC PANEL - Abnormal       Result Value    Sodium 139      Potassium 4.2      Chloride 102      Carbon Dioxide (CO2) 21 (*)     Anion Gap 16      Urea Nitrogen 32 (*)     Creatinine 1.22      Calcium 9.5      Glucose 148 (*)     GFR Estimate 57 (*)    CBC WITH PLATELETS AND DIFFERENTIAL - Abnormal    WBC Count 21.2 (*)     RBC Count 4.16 (*)     Hemoglobin 12.5 (*)     Hematocrit 38.1 (*)     MCV 92      MCH 30.0      MCHC 32.8      RDW 14.4      Platelet Count 473 (*)     % Neutrophils 95      % Lymphocytes 1      % Monocytes 3      % Eosinophils 0      % Basophils 0      % Immature Granulocytes 1      NRBCs  per 100 WBC 0      Absolute Neutrophils 20.1 (*)     Absolute Lymphocytes 0.3 (*)     Absolute Monocytes 0.7      Absolute Eosinophils 0.0      Absolute Basophils 0.1      Absolute Immature Granulocytes 0.1      Absolute NRBCs 0.0     D DIMER QUANTITATIVE - Abnormal    D-Dimer Quantitative 2.70 (*)    MAGNESIUM - Normal    Magnesium 1.8     TROPONIN I - Normal    Troponin I 0.05     ROUTINE UA WITH MICROSCOPIC REFLEX TO CULTURE   LACTIC ACID WHOLE BLOOD   BLOOD CULTURE   BLOOD CULTURE       RADIOLOGY:  Reviewed all pertinent imaging. Please see official radiology report.  CT Chest Pulmonary Embolism w Contrast   Final Result   IMPRESSION:   1.  No pulmonary embolus.   2.  Acute nondisplaced fracture of the right 12th rib.   3.  Coronary artery disease.      CT Cervical Spine w/o Contrast   Final Result   IMPRESSION:   1.  No fracture or posttraumatic subluxation.   2.  Degenerative changes, as above.                       CT Head w/o Contrast   Final Result   IMPRESSION:   1.  No CT evidence for acute intracranial process.   2.  Brain atrophy and presumed chronic microvascular ischemic changes as above.   3.  Nonspecific left maxillary sinus air-fluid level. This finding may be related to trauma or acute sinusitis.                                  EKG:    Performed at: 454  Impression: Sinus tachycardia.  No acute ischemic changes.  Unchanged from previous dated March 18, 2020  Sinus tachycardia ventricular 123.  Parable 152.  QRS 78.  QTc 440    I have independently reviewed and interpreted the EKG(s) documented above.          I, Sophia Francisco, am serving as a scribe to document services personally performed by Dr. Cleveland Thompson, based on my observation and the provider's statements to me. I, Cleveland Thompson MD attest that Sophia Francisco is acting in a scribe capacity, has observed my performance of the services and has documented them in accordance with my direction.    Cleveland Thompson M.D.  Emergency  [facility] EMERGENCY ROOM  [address] [address]  [phone]  Dept: [phone]     Cleveland Thompson MD  02/24/23 0729

## 2023-02-24 NOTE — DISCHARGE INSTRUCTIONS
LLE lateral calf - every three days  Keep open to air as long as skin remains intact.  If skin opens, then cleanse open area with saline, gently dry.  Cover with Mepilex dressing.      Home care services have been arranged for you:  Home Care Agency: [facility]  Home Care Services: RN, PT, OT  Home Care Phone: [phone]    Instructions: Home care agency will call within 48 hours of discharge to schedule appointments.

## 2023-02-24 NOTE — ED NOTES
Pt has no appetite, Positive orthostatics noted, not stable standing, very stiff with movement. Would benefit with PT and OT eval which is ordered. MD paged    ANDREIA COMER RN

## 2023-02-24 NOTE — CONSULTS
[facility]  WOC Nurse Inpatient Assessment     Consulted for: LLE - lateral    Patient History (according to provider note(s):        Areas Assessed:    Wound location: LLE lateral      Last photo: 2/24/23  Wound due to: Unknown etiology - not a typical place for a pressure injury to present, but pressure cannot be ruled out at this time  Wound history/plan of care: New injury  Wound base: 100 % purple and epidermis      Palpation of the wound bed: normal      Drainage: none     Description of drainage: none     Measurements (length x width x depth, in cm): 3 cm x 1 cm with halo of induration and erythema measuring 3 cm x  3.5 cm (no warmth noted and not significantly painful for patient at rest or with palpation)      Tunneling: N/A     Undermining: N/A  Periwound skin: See above      Color: pink      Temperature: normal   Odor: none  Pain: denies   Pain interventions prior to dressing change: N/A  Treatment goal: Maintain (prevention of deterioration)  STATUS: initial assessment  Supplies ordered: at bedside       Treatment Plan:     LLE lateral calf - every three days  Keep open to air as long as skin remains intact.  If skin opens, then cleanse open area with saline, gently dry.  Cover with Mepilex dressing.    Orders: Written    RECOMMEND PRIMARY TEAM ORDER: None, at this time  Education provided: plan of care  Discussed plan of care with: Patient  WOC nurse follow-up plan: weekly  Notify WOC if wound(s) deteriorate.  Nursing to notify the Provider(s) and re-consult the WOC Nurse if new skin concern.    DATA:     Current support surface: Standard  ED cart  Containment of urine/stool: Continent of bladder and Continent of bowel  BMI: There is no height or weight on file to calculate BMI.   Active diet order: Orders Placed This Encounter      Combination Diet Regular Diet Adult     Output: No intake/output data recorded.     Labs: Recent Labs   Lab 02/24/23  0507   HGB 12.5*   WBC 21.2*      Pressure injury risk assessment:                          Linsey Cunningham, MSN RN CWOCN   Pager no longer is use, please contact through [employer] group: WOC Nurse

## 2023-02-24 NOTE — CONSULTS
Care Management Initial Consult    General Information  Assessment completed with: Patient, Family, Pt, son Lauri, and HIEN Corey Teresa.  Type of CM/SW Visit: Initial Assessment  Primary Care Provider verified and updated as needed: Yes   Readmission within the last 30 days: no previous admission in last 30 days   Reason for Consult: discharge planning, health care directive, transportation  Advance Care Planning: Advance Care Planning Reviewed: no concerns identified        Communication Assessment  Patient's communication style: spoken language (English or Bilingual)        Cognitive  Cognitive/Neuro/Behavioral: .WDL except                Best Language: 0 - No aphasia       Living Environment:   People in home: spouse     Current living Arrangements: house      Able to return to prior arrangements: other (see comments) (TBD further)     Family/Social Support:  Care provided by: self, spouse/significant other (Pt and wife assist each other as needed.)  Provides care for: spouse (As able.)  Marital Status:   Wife, Children  Barbi       Description of Support System: Supportive, Involved (As needed/able.)    Support Assessment: Lacks adequate physical care, Lacks necessary supervision and assistance    Current Resources:   Patient receiving home care services: No  Community Resources: None  Equipment currently used at home: grab bar, tub/shower  Supplies currently used at home: None    Employment/Financial:  Employment Status: retired     Financial Concerns: No concerns identified   Referral to Financial Worker: No     Lifestyle & Psychosocial Needs:  Social Determinants of Health     Tobacco Use: Not on file   Alcohol Use: Not on file   Financial Resource Strain: Not on file   Food Insecurity: Not on file   Transportation Needs: Not on file   Physical Activity: Not on file   Stress: Not on file   Social Connections: Not on file   Intimate Partner Violence: Not on file   Depression: Not on file   Housing  "Stability: Not on file     Functional Status:  Prior to admission patient needed assistance:   Dependent ADLs:: Independent (Struggling. Wife assists as needed/able.)  Dependent IADLs:: Independent (Struggling. Wife assists as needed/able.)  Assessment of Functional Status: Not at  functional baseline    Mental Health Status:  Mental Health Status: No Current Concerns       Chemical Dependency Status:  Chemical Dependency Status: No Current Concerns           Values/Beliefs:  Spiritual, Cultural Beliefs, Jehovah's witness Practices, Values that affect care: no (None identified)             Additional Information:  Writer briefly met with pt and met with son Lauri and daughter-in-law Teresa(both are [employer] employees) to introduce Care Management(CM) and obtain an initial assessment. Pt resides in a house with his wife who has some minor cognitive impairment. Pt and wife have been providing assistance to each other to maintain independence in the home. Per family report, pt has been struggling with ability to care for himself and they would like to see the use of a walker introduced at minimum. Family is open to TCU placement if required for pt safety. Pt/family was provided with the Medicare Compare list for TCU or Home Care services and discussed associated Medicare star ratings to assist with choice for referrals/discharge planning. Education was given to pt/family that star ratings are updated/maintained by Medicare and can be reviewed by visiting www.medicare.gov - Yes.    02/24 per BENJAMÍN Levy-\"88 year old male presents to the Emergency Department for evaluation of fall.  Sounds that he may have had a syncopal episode.  There is a period that who is unresponsive.  No seizure activity noted.  Due to a head CT.  There is no signs of intracranial hemorrhage.  No mass.  Neck CT is negative.  Patient is tachycardic.  EKG does not show anything other than tachycardia.  Troponin is negative.  White count is elevated.  Did do a " "CT scan of his chest given elevated D-dimer.  This is negative for PE.  No infiltrate.  Unclear what caused his weakness.  Has been having some vomiting and diarrhea.  Could be GI illness.  Abdomen is nontender.  I will think he needs abdominal CT.  Lactic and blood cultures are drawn and pending.  As is the urinalysis.  Given his weakness and likely syncope patient be admitted for further care.  Given IV fluids to help with his tachycardia.  Discussed with Dr. Smith for who accepted admit.\"    PT, OT, SPL, and WOC Consults pending. CM to follow-up on consult results and assist with service coordination needs as appropriate.     Douglas Vogel RN        "

## 2023-02-25 NOTE — PROGRESS NOTES
Occupational Therapy     02/25/23 1030   Appointment Info   Signing Clinician's Name / Credentials (OT) Karin Carvalho OT   Living Environment   People in Home spouse   Current Living Arrangements house   Living Environment Comments shower in basement   Self-Care   Usual Activity Tolerance good   Current Activity Tolerance fair   Activity/Exercise/Self-Care Comment pt reports independence with dressing, toileting, bathing   Instrumental Activities of Daily Living (IADL)   Previous Responsibilities yardwork   IADL Comments Pt reports shared household responsibilities with spouse   General Information   Onset of Illness/Injury or Date of Surgery 02/24/23   Referring Physician Dejon Dsouza   Patient/Family Therapy Goal Statement (OT) get stronger so I can go home   Additional Occupational Profile Info/Pertinent History of Current Problem N/V, Tachycardia, diarrhea, fall   Cognitive Status Examination   Orientation Status orientation to person, place and time   Affect/Mental Status (Cognitive) WFL;other (see comments)  (monitor)   Follows Commands WFL   Range of Motion Comprehensive   Comment, General Range of Motion BUE WFL   Strength Comprehensive (MMT)   Comment, General Manual Muscle Testing (MMT) Assessment generalized weakness   Bed Mobility   Bed Mobility sit-supine   Sit-Supine [place] (Bed Mobility) moderate assist (50% patient effort)   Assistive Device (Bed Mobility) bed rails   Transfers   Transfers sit-stand transfer   Sit-Stand Transfer   Sit-Stand [place] (Transfers) moderate assist (50% patient effort)   Assistive Device (Sit-Stand Transfers) walker, front-wheeled   Clinical Impression   Criteria for Skilled Therapeutic Interventions Met (OT) Yes, treatment indicated   OT Diagnosis Decreased ADL independence   OT Problem List-Impairments impacting ADL problems related to;activity tolerance impaired;balance;mobility;strength   Assessment of Occupational Performance 5 or more Performance  Deficits   Identified Performance Deficits dressing, toileting, bathing, transfers, bed mob   Planned Therapy Interventions (OT) ADL retraining;bed mobility training;strengthening;transfer training;progressive activity/exercise   Clinical Decision Making Complexity (OT) moderate complexity   Risk & Benefits of therapy have been explained evaluation/treatment results reviewed;care plan/treatment goals reviewed;patient   OT Total Evaluation Time   OT Eval, Moderate Complexity Minutes (95167) 10   OT Goals   Therapy Frequency (OT) Daily   OT Predicted Duration/Target Date for Goal Attainment 03/03/23   OT Goals Lower Body Dressing;Hygiene/Grooming;Toilet Transfer/Toileting   OT: Hygiene/Grooming supervision/stand-by assist;while standing   OT: Lower Body Dressing Supervision/stand-by assist   OT: Toilet Transfer/Toileting Supervision/stand-by assist   Interventions   Interventions Quick Adds Self-Care/Home Management   Self-Care/Home Management   Self-Care/Home Mgmt/ADL, Compensatory, Meal Prep Minutes (49327) 25   Symptoms Noted During/After Treatment (Meal Preparation/Planning Training) fatigue   Treatment Detail/Skilled Intervention sit<>stand transfers, mult reps in session with FWW, Mod A; verbal cues for safe transfer technique. LB dressing, Mod A; verbal cues. Toilet transfer with RTS with grab bars; Min-Mod A. Functional mob in room with FWW, CGA/Min A with verbal cues for safety. Bed mob sit>supine, Mod A; verbal cues. Pt lying in bed at end of session; all needs within reach   OT Discharge Planning   OT Plan 3/3- dressing, toileting, bed mob, transfers, g/h at sink with chair behind, act rolanda   OT Discharge Recommendation (DC Rec) (S)  Transitional Care Facility   OT Rationale for DC Rec Pt is a falls risk; requires assist with all ADLs and functional mob at this time for safety. Mod A transfers. Not at baseline   OT Brief overview of current status Mod A transfers and ADLs   Total Session Time   Timed Code  Treatment Minutes 25   Total Session Time (sum of timed and untimed services) 35     Karin Carvalho, OT 2/25/2023

## 2023-02-25 NOTE — PLAN OF CARE
Problem: Plan of Care - These are the overarching goals to be used throughout the patient stay.    Goal: Plan of Care Review  Description: The Plan of Care Review/Shift note should be completed every shift.  The Outcome Evaluation is a brief statement about your assessment that the patient is improving, declining, or no change.  This information will be displayed automatically on your shift note.  Outcome: Progressing  Flowsheets (Taken 2/25/2023 6859)  Plan of Care Reviewed With: patient  Overall Patient Progress: improving   Goal Outcome Evaluation:      Plan of Care Reviewed With: patient    Overall Patient Progress: improvingOverall Patient Progress: improving       Patient's stool came back positive for Norovirus. Enteric precautions added. Normal saline infusing through peripheral IV. Assist of 1 with a walker with ADLs. Vitals stable on RA. Will continue to monitor.

## 2023-02-25 NOTE — PLAN OF CARE
Pt admitted to unit from ED. A&O x4. VSS. NSR/sinus tachycardia on telemetry. Weaned off of nasal canula. Urinal voiding. Still need stool sample. Using call light appropriately. Resting between cares. Will continue to monitor and follow plan of care.     Problem: Plan of Care - These are the overarching goals to be used throughout the patient stay.    Goal: Absence of Hospital-Acquired Illness or Injury  Outcome: Progressing  Intervention: Identify and Manage Fall Risk  Recent Flowsheet Documentation  Taken 2/25/2023 0400 by Brook Lr RN  Safety Promotion/Fall Prevention:   activity supervised   bed alarm on   lighting adjusted   room door open   room near nurse's station   room organization consistent   safety round/check completed  Taken 2/25/2023 0000 by Brook Lr RN  Safety Promotion/Fall Prevention:   activity supervised   bed alarm on   lighting adjusted   room door open   room near nurse's station   room organization consistent   safety round/check completed  Intervention: Prevent Skin Injury  Recent Flowsheet Documentation  Taken 2/25/2023 0400 by Brook Lr RN  Body Position: position changed independently  Taken 2/25/2023 0000 by Brook Lr RN  Body Position: position changed independently   Goal Outcome Evaluation:

## 2023-02-25 NOTE — ED NOTES
Family and MD notified of patient's positive stool results of Norovirus. Enteric precautions placed.

## 2023-02-25 NOTE — UTILIZATION REVIEW
Admission Status; Secondary Review Determination       Under the authority of the Utilization Management Committee, the utilization review process indicated a secondary review on the above patient. The review outcome is based on review of the medical records, discussions with staff, and applying clinical experience noted on the date of the review.     (x) Inpatient Status Appropriate - This patient's medical care is consistent with medical management for inpatient care and reasonable inpatient medical practice.     RATIONALE FOR DETERMINATION     Mr. Carrillo is a 89 yo male pt with a PMH significant for Parkinson's disease who presents to the ED with c/o N/V/D and syncopal episode resulting in a mechanical fall at his home. Concern for early sepsis with significant tachycardia, tachypnea and leukocytosis.  Noted to have elevated elevated CK, significant leukocytosis and elevated creatine.  Noted to be significantly orthostatic; given NS bolius and then IVF.  This am, CK is more elevated and is being monitored closely.  He continues on IVF today.  Still having loose stools and stool culture pending.        At the time of admission with the information available to the attending physician more than 2 nights Hospital complex care was anticipated, based on patient risk of adverse outcome if treated as outpatient and complex care required. Inpatient admission is appropriate based on the Medicare guidelines.       The information on this document is developed by the utilization review team in order for the business office to ensure compliance. This only denotes the appropriateness of proper admission status and does not reflect the quality of care rendered.   The definitions of Inpatient Status and Observation Status used in making the determination above are those provided in the CMS Coverage Manual, Chapter 1 and Chapter 6, section 70.4.         Sincerely,     Abbey Murdock, DO  Utilization Review  Physician  Advisor  [facility].

## 2023-02-25 NOTE — PROGRESS NOTES
"   02/25/23 0800   Appointment Info   Signing Clinician's Name / Credentials (PT) Josephine Cross, PT, DPT   Living Environment   People in Home spouse   Current Living Arrangements house   Home Accessibility stairs to enter home;stairs within home   Number of Stairs, Main Entrance 7   Stair Railings, Main Entrance railing on left side (ascending)   Number of Stairs, Within Home, Primary greater than 10 stairs  (14)   Stair Railings, Within Home, Primary railing on left side (ascending)   Living Environment Comments shower in the basement   Self-Care   Usual Activity Tolerance good   Current Activity Tolerance fair   Equipment Currently Used at Home cane, straight   Fall history within last six months yes   Number of times patient has fallen within last six months   (reports \"did not fall\", got onto knees to throw up and couldn't stand back up)   General Information   Onset of Illness/Injury or Date of Surgery 02/24/23   Referring Physician Jesse Dsouza MD   Pertinent History of Current Problem (include personal factors and/or comorbidities that impact the POC) N/V, Tachycardia, diarrhea, fall   Existing Precautions/Restrictions no known precautions/restrictions   Weight-Bearing Status - LLE full weight-bearing   Weight-Bearing Status - RLE full weight-bearing   Strength (Manual Muscle Testing)   Strength Comments generalized weakness   Bed Mobility   Bed Mobility supine-sit   Supine-Sit [place] (Bed Mobility) moderate assist (50% patient effort)   Transfers   Transfers sit-stand transfer   Sit-Stand Transfer   Sit-Stand [place] (Transfers) minimum assist (75% patient effort);verbal cues   Assistive Device (Sit-Stand Transfers) walker, front-wheeled   Gait/Stairs (Locomotion)   [place] Level (Gait) contact guard;verbal cues   Assistive Device (Gait) walker, front-wheeled   Distance in Feet 2'   Distance in Feet (Gait) 1x4', 1x6'   Pattern (Gait) step-to;step-through   Deviations/Abnormal Patterns " (Gait) gait speed decreased;stride length decreased;nila decreased   Clinical Impression   Criteria for Skilled Therapeutic Intervention Yes, treatment indicated   PT Diagnosis (PT) impaired functional mobility   Influenced by the following impairments weakness, pain   Functional limitations due to impairments gait, stairs, transfers   Clinical Presentation (PT Evaluation Complexity) Stable/Uncomplicated   Clinical Presentation Rationale pt presents as medically diagnosed   Clinical Decision Making (Complexity) moderate complexity   Planned Therapy Interventions (PT) balance training;bed mobility training;gait training;home exercise program;patient/family education;ROM (range of motion);stair training;strengthening;transfer training   Anticipated Equipment Needs at Discharge (PT) walker, rolling   Risk & Benefits of therapy have been explained care plan/treatment goals reviewed;patient   PT Total Evaluation Time   PT Eval, Moderate Complexity Minutes (53838) 10   Physical Therapy Goals   PT Frequency Daily   PT Predicted Duration/Target Date for Goal Attainment 03/03/23   PT Goals Transfers;Gait;Stairs   PT: Transfers Modified independent;Sit to/from stand;Assistive device  (FWW)   PT: Gait Modified independent;Rolling walker;100 feet   PT: Stairs 7 stairs;Rail on left;Assistive device  (CGA, SPC)   Interventions   Interventions Quick Adds Gait Training;Therapeutic Activity   Therapeutic Activity   Therapeutic Activities: dynamic activities to improve functional performance Minutes (22215) 27   Symptoms Noted During/After Treatment Fatigue   Treatment Detail/Skilled Intervention supine to sit with increased time due to slow movements, cueing for hand placement and LE advancement, Min-mod A for trunk, leaning to the L and posteriorly, Max A for seated scoot to EOB, Min-Mod A for seated balance at EOB due to posterior lean, cueing for bending knees to place feet on floor, sit to/from stand x2 reps with min A x1-2  for first rep and CGA for second rep, standing balance with bilat UE support on FWW, 2x3 min, cueing for posture, pt adjusts feet for wider CHU, initial rep wit hposterior lean requiring Mod A for balance, second rep with CGA, Max A for sally cares and donning brief   Gait Training   Symptoms Noted During/After Treatment (Gait Training) fatigue   Treatment Detail/Skilled Intervention cueing for posture and safety, cueing for FWW advancement   [place] Level (Gait Training) minimum assist (75% patient effort)   Physical Assistance Level (Gait Training) supervision;verbal cues   Weight Bearing (Gait Training) weight-bearing as tolerated   Assistive Device (Gait Training) rolling walker   Pattern Analysis (Gait Training) swing-to gait   Gait Analysis Deviations decreased nila;decreased step length;decreased stride length   Impairments (Gait Analysis/Training) strength decreased   PT Discharge Planning   PT Plan gait, stairs, transfers, LE therex   PT Discharge Recommendation (DC Rec) (S)  Transitional Care Facility   PT Rationale for DC Rec TCU due to requiring Ax1-2 for mobility at this time, 7 stairs to enter home, multiple falls   PT Brief overview of current status Ax1-2 for transfers   Total Session Time   Timed Code Treatment Minutes 27   Total Session Time (sum of timed and untimed services) 37

## 2023-02-25 NOTE — PROGRESS NOTES
"Speech-Language Pathology: Clinical Swallow Evaluation     02/25/23 1200   Appointment Info   Signing Clinician's Name / Credentials (SLP) Gretchen Case MA, CCC-SLP   General Information   Onset of Illness/Injury or Date of Surgery 02/24/23   Referring Physician Jeromy Smith,    Pertinent History of Current Problem Per ordering provider \"Javier Carrillo is a 88 year old male with history of Parkinson's disease, frequent falls, essential hypertension, hypertriglyceridemia, presented to [facility] on 2/24/2023 for nausea, vomiting, diarrhea and fall in his home.  Patient underwent outpatient physical therapy for muscle weakness and difficulty walking.     In the emergency room patient's vital signs remarkable for tachycardia with heart rate of 122 bpm.  Blood pressure was elevated between 130 and 166 mmHg systolic.  Patient was initially on 95% on room air for some reason placed on 3 L/min via nasal cannula still saturating 95%.  Laboratory findings were remarkable for carbon dioxide of 21, urea nitrogen 32.  Creatinine was 1.22, GFR 57.  Anion gap elevated at 16.  Glucose was elevated 148 mg/dL.  CBC remarkable for leukocytosis of 21.2.  Hemoglobin was decreased to 12.5 platelet count mildly elevated at 473.  Differential is predominantly neutrophils with 0.3 absolute lymphocytes.  D-dimer elevated at 2.7.  CT chest pulmonary embolism with contrast showed no pulmonary embolus.  There is an acute nondisplaced fracture of the right 12th rib and evidence of coronary artery disease.  CT head and cervical spine with no evidence of acute intracranial process, fracture or posttraumatic subluxation.  EKG revealed sinus tachycardia without acute ST or T wave changes.  No significant change from March 2020 EKG.\".   General Observations Patient alert and cooperative   Type of Evaluation   Type of Evaluation Swallow Evaluation   Oral Motor   Oral Musculature generally intact   Mucosal Quality adequate " "  Dentition (Oral Motor)   Dentition (Oral Motor) adequate dentition   Facial Symmetry (Oral Motor)   Facial Symmetry (Oral Motor) WNL   Lip Function (Oral Motor)   Lip Range of Motion (Oral Motor) WNL   Lip Strength (Oral Motor) WNL   Tongue Function (Oral Motor)   Tongue Strength (Oral Motor) WNL   Tongue Coordination/Speed (Oral Motor) WNL   Tongue ROM (Oral Motor) WNL   Jaw Function (Oral Motor)   Jaw Function (Oral Motor) WNL   Cough/Swallow/Gag Reflex (Oral Motor)   Soft Palate/Velum (Oral Motor) WNL   Volitional Throat Clear/Cough (Oral Motor) WNL   Volitional Swallow (Oral Motor) WNL   Vocal Quality/Secretion Management (Oral Motor)   Vocal Quality (Oral Motor) WNL   Secretion Management (Oral Motor) WNL   General Swallowing Observations   Past History of Dysphagia None per EMR or pt and RN report. Pt denies coughing with intake. He reports occasional sensation of dry foods getting \"stuck\" when swallowing.   Respiratory Support (General Swallowing Observations) none   Current Diet/Method of Nutritional Intake (General Swallowing Observations, NIS) regular diet;thin liquids (level 0)   Swallowing Evaluation Clinical swallow evaluation   Clinical Swallow Evaluation   Clinical Swallow Evaluation Textures Trialed thin liquids;solid foods   Clinical Swallow Eval: Thin Liquid Texture Trial   Mode of Presentation, Thin Liquids cup;straw;self-fed   Volume of Liquid or Food Presented 8oz   Oral Phase of Swallow WFL   Pharyngeal Phase of Swallow intact   Diagnostic Statement No overt s/s aspiration   Clinical Swallow Evaluation: Solid Food Texture Trial   Mode of Presentation fed by clinician   Volume Presented x9 bites   Oral Phase WFL   Pharyngeal Phase intact   Successful Strategies Trialed During Procedure   (Trained liquid wash and GERD precautions.)   Diagnostic Statement No sensation of solid feeling \"stuck\". Pt used strategy after initial training.   Esophageal Phase of Swallow   Patient reports or presents " "with symptoms of esophageal dysphagia Yes   Esophageal comments Hx of GERD. Pt reports occasional sensation of dry foods (crackers and steak) getting \"stuck\" when swallowing. He denies taking medication for GERD or reflux.   Swallowing Recommendations   Diet Consistency Recommendations regular diet;thin liquids (level 0)   Mode of Delivery Recommendations bolus size, small;slow rate of intake   Swallowing Maneuver Recommendations alternate food and liquid intake   Recommended Feeding/Eating Techniques (Swallow Eval) maintain upright posture during/after eating for 30 minutes   Medication Administration Recommendations, Swallowing (SLP) One at a time   Instrumental Assessment Recommendations instrumental evaluation not recommended at this time   Comment, Swallowing Recommendations Clinical Swallow Study completed. Patient had no s/s aspiration with any intake. No sensation of solids feeling stuck with use of strategies. Oral motor function was WFL. Mastication was WFL. Hyolaryngeal elevation appears present upon visualization and palpation. Recommend diet of regular and thin with strategies of alternate liquids/solids, small bites, slow rate, and GERD precautions. No ongoing SLP services warranted at this time. Consider GI consult for further evaluation of esophageus and GERD management as warranted.   General Therapy Interventions   Planned Therapy Interventions Dysphagia Treatment   Dysphagia treatment Compensatory strategies for swallowing   Clinical Impression   Criteria for Skilled Therapeutic Interventions Met (SLP Eval) Yes, treatment indicated   Risks & Benefits of therapy have been explained evaluation/treatment results reviewed;care plan/treatment goals reviewed;risks/benefits reviewed;participants voiced agreement with care plan;participants included;patient   SLP Total Evaluation Time   Eval: oral/pharyngeal swallow function, clinical swallow Minutes (27694) 15   SLP Goals   Therapy Frequency (SLP Eval) " one time eval and treatment only   SLP Predicted Duration/Target Date for Goal Attainment 02/25/23   SLP Goals Swallow   SLP: Safely tolerate diet without signs/symptoms of aspiration Regular diet;Thin liquids;With use of compensatory swallow strategies;Goal Met;Completed                     SLP Discharge Planning   SLP Discharge Recommendation   (Per treatment team)   SLP Rationale for DC Rec No ongoing SLP services anticipated at d/c   SLP Brief overview of current status  Recommend diet of regular and thin with strategies of alternate liquids/solids, small bites, slow rate, and GERD precautions. No ongoing SLP services warranted at this time. Consider GI consult for further evaluation of esophageus and GERD management as warranted.

## 2023-02-25 NOTE — PROGRESS NOTES
[facility]    Medicine Progress Note - Hospitalist Service    Date of Admission:  2/24/2023    Assessment & Plan   Javier Carrillo is a 88 year old male with history of Parkinson's disease, orthostatic hypotension, chronic kidney disease stage III, frequent falls, gastroesophageal reflux disease, essential hypertension, hypertriglyceridemia, admitted on 2/24/2023 for nausea, vomiting, diarrhea, syncopal episode and fall in his home.   Doing better 2/25 with no nausea vomiting but recurrent loose stools.     Fall initial encounter  Syncope, orthostatic  Elevated D-dimer  Nausea vomiting, diarrhea  Chronic muscle weakness, gait instability.   Rhabdomyolysis, mild.   History of Orthostatic hypotension.  Total ck was elevated >1,000 on admission and up slightly  Cardiac monitoring  Order transthoracic echocardiogram reviewed with small pericardial effusion but preserved EF  Continue fall and aspiration precautions with speech evaluation pending  Order PT, OT  Decrease normal saline to 75 an hour  Orthostatic vital signs requested  Order ondansetron 4 mg IV every 6 hours as needed  FLUVID PCR screen negative  Order potassium magnesium replacement protocol RN managed.  Order enteric PCR panel with results pending  No indication for C. difficile testing   Repeat total CK slightly, continue fluids and oral hydration     Left later knee pressure injury present prior to arrival.   Order WOC consultation.      Incidental finding of nondisplaced 12th rib fracture.   Lidocaine patch, tylenol 650 mg po q4h prn for pain.      Essential hypertension  Hold lisinopril 10 mg daily     Chronic knee arthroplasty infection  On chronic suppressive therapy  Continue cefadroxil 500mg po BID     Parkinson's disease  Order Sinemet  mg 1 tab 3 times daily     Gastroesophageal reflux disease  PTA medication: Omeprazole 20 mg daily as needed     Chronic kidney disease stage III:   Repeat base metabolic profile in  a.m.    Anemia  Likely due to volume expansion  No signs of losses  Outpatient work-up     Hypertriglyceridemia-not currently on medical management.          Diet: Combination Diet Regular Diet Adult    DVT Prophylaxis: Pneumatic Compression Devices  East Catheter: Not present  Lines: None     Cardiac Monitoring: None  Code Status: Full Code                                  Disposition Plan      Expected Discharge Date: 02/26/2023      Destination: home with help/services;home with family  Discharge Comments: Med adjustments? PT/OT/Speech          Jesse Dsouza MD  Hospitalist Service  [facility]  Securely message with [employer] (more info)  Text page via [employer] Paging/Directory   ______________________________________________________________________    Interval History   Doing well.  No fevers or chills.  No nausea or vomiting.  Some loose stools but no blood and partially formed.  Enteric pathogen PCR has been sent.  Patient's not feeling lightheaded.  He states he did not faint but just fell over.  He was feeling nauseous and we got down on his knees over the toilet and then fell to his side and could not get up.  He feels stronger today than he did yesterday.    Physical Exam   Vital Signs: Temp: 97.7  F (36.5  C) Temp src: Oral BP: 132/62 Pulse: 96   Resp: 18 SpO2: 95 % O2 Device: None (Room air) Oxygen Delivery: 2 LPM  Weight: 141 lbs 0 oz    GENERAL:  Alert, appears comfortable, in no acute distress, appears stated age   HEAD:  Normocephalic, without obvious abnormality, atraumatic   EYES:  PERRL, conjunctiva/corneas clear, no scleral icterus, EOM's intact   NECK: Supple, symmetrical, trachea midline   LUNGS:   Clear to auscultation bilaterally, no rales, rhonchi, or wheezing, symmetric chest rise on inhalation, respirations unlabored   HEART:  Regular rate and rhythm, S1 and S2 normal, no murmur, rub, or gallop    ABDOMEN:   Soft, non-tender, bowel sounds active all four quadrants,  no masses, no organomegaly, no rebound or guarding   EXTREMITIES: Extremities normal, atraumatic, no cyanosis or edema    SKIN: Dry to touch, no exanthems in the visualized areas   NEURO: Alert, oriented x 4, moves all four extremities freely/spontaneously   PSYCH: Cooperative, behavior is appropriate          Data     I have personally reviewed the following data over the past 24 hrs:    11.4 (H)  \   10.5 (L)   / 352     137 107 28 /  95   3.5 21 (L) 0.93 \       Imaging results reviewed over the past 24 hrs:   Recent Results (from the past 24 hour(s))   Echocardiogram Complete   Result Value    LVEF  55-60%    Narrative    [id number]  [id number]  [id number]  [id number]^[place] [address]     Name: TRUDY BEEBE  MRN: [id number]  : 1934  Study Date: 2023 12:57 PM  Age: 88 yrs  Gender: Male  Patient Location: [facility]  Reason For Study: Syncope and Collapse  Ordering Physician: SHELLEY MUSE  Performed By: AT     BSA: 1.9 m2  Height: 66 in  Weight: 177 lb  HR: 117  BP: 116/58 mmHg  ______________________________________________________________________________  Procedure  Complete Echo Adult. Definity (NDC #80461-853) given intravenously.  ______________________________________________________________________________  Interpretation Summary     The visual ejection fraction is 55-60%.  Regional wall motion abnormalities cannot be excluded due to limited  visualization.  The right ventricle is normal in size and function.  There is trace to mild mitral regurgitation.  Small pericardial effusion  Incomplete assessment for tamponade performed.  Technically difficult, suboptimal study.  ______________________________________________________________________________  Left Ventricle  The left ventricle is normal in size. There is normal left ventricular wall  thickness. The visual ejection fraction is 55-60%. Regional wall motion  abnormalities cannot  be excluded due to limited visualization.     Right Ventricle  The right ventricle is normal in size and function.     Atria  Normal left atrial size. Right atrial size is normal.     Mitral Valve  Mitral valve leaflets appear normal. There is trace to mild mitral  regurgitation.     Tricuspid Valve  The tricuspid valve is not well visualized.     Aortic Valve  The aortic valve is not well visualized. No aortic regurgitation is present.  No aortic stenosis is present.     Pulmonic Valve  The pulmonic valve is not well visualized.     Vessels  The aorta root is normal. The thoracic aorta cannot be assessed. The inferior  vena cava cannot be assessed.     Pericardium  Small pericardial effusion. Incomplete assessment for tamponade performed.     ______________________________________________________________________________  MMode/2D Measurements & Calculations  Ao root diam: 3.0 cm  LVOT diam: 2.2 cm  LVOT area: 3.9 cm2     Doppler Measurements & Calculations  MV E max low: 60.6 cm/sec  MV A max low: 110.6 cm/sec  MV E/A: 0.55  MV dec slope: 642.9 cm/sec2  MV dec time: 0.10 sec  Ao V2 max: 108.6 cm/sec  Ao max P.0 mmHg  Ao V2 mean: 79.5 cm/sec  Ao mean P.9 mmHg  Ao V2 VTI: 19.9 cm  AZAR(I,D): 3.5 cm2  AZAR(V,D): 3.4 cm2  LV V1 max PG: 3.6 mmHg  LV V1 max: 94.6 cm/sec  LV V1 VTI: 17.7 cm  SV(LVOT): 69.0 ml  SI(LVOT): 36.4 ml/m2  AV Low Ratio (DI): 0.87  AZAR Index (cm2/m2): 1.8  E/E': 11.4  E/E' av.6  Lateral E/e': 7.8  Medial E/e': 11.4  Peak E' Low: 5.3 cm/sec     ______________________________________________________________________________  Report approved by: Dominic Lackey 2023 02:05 PM           Recent Labs   Lab 23  0507   WBC 11.4* 21.2*   HGB 10.5* 12.5*   MCV 95 92    473*    139   POTASSIUM 3.5 4.2   CHLORIDE 107 102   CO2 21* 21*   BUN 28 32*   CR 0.93 1.22   ANIONGAP 9 16   DEVON 8.0* 9.5   GLC 95 148*

## 2023-02-25 NOTE — PROGRESS NOTES
Report given to 3N RN  A&Ox4 follows commands  Positive orthostatic BPs, awaiting therapy evaluation to ambulate patient.   Bedrest for now. Positions independently.   2L during the day, titrated O2 to room air. Diminished lung sounds.  SR/ST. VSS.  Stool sample needed. Uses urinal, will make needs known   Scrap/abrasion/bruise L lateral calf.   Per charting, pt has been refusing lidocaine patch.   Repeat labs in morning.

## 2023-02-26 NOTE — PLAN OF CARE
VSS. SBPs trending in the 170's. Denies pain. 1 brief episode of nausea overnight. Up to bathroom a few times for diarrhea. IVF infusing. Resting between cares overnight. Will continue to monitor and follow plan of care.     Problem: Diarrhea  Goal: Effective Diarrhea Management  Intervention: Manage Diarrhea  Recent Flowsheet Documentation  Taken 2/26/2023 0400 by Brook Lr, RN  Medication Review/Management: medications reviewed  Taken 2/26/2023 0000 by Brook Lr, RN  Medication Review/Management: medications reviewed  Taken 2/25/2023 2000 by Brook Lr, RN  Medication Review/Management: medications reviewed     Problem: Risk for Delirium  Goal: Improved Sleep  Outcome: Progressing   Goal Outcome Evaluation:

## 2023-02-26 NOTE — PROGRESS NOTES
[facility]    Medicine Progress Note - Hospitalist Service    Date of Admission:  2/24/2023    Assessment & Plan   Javier Carrillo is a 88 year old male with history of Parkinson's disease, orthostatic hypotension, chronic kidney disease stage III, frequent falls, gastroesophageal reflux disease, essential hypertension, hypertriglyceridemia, admitted on 2/24/2023 for nausea, vomiting, diarrhea, syncopal episode and fall in his home.   Doing better 2/25 with no nausea vomiting but recurrent loose stools.     Fall initial encounter  Syncope, orthostatic  Elevated D-dimer  Nausea vomiting, diarrhea  Chronic muscle weakness, gait instability.   Rhabdomyolysis, mild.   History of Orthostatic hypotension.  Total ck was elevated >1,000 on admission  and now trending down  Cardiac monitoring  Order transthoracic echocardiogram reviewed with small pericardial effusion but preserved EF  Continue fall and aspiration precautions with speech evaluation pending  Order PT, OT  Decrease normal saline to 75 an hour  Orthostatic vital signs  relatively normal  Order ondansetron 4 mg IV every 6 hours as needed  FLUVID PCR screen negative  Order potassium magnesium replacement protocol RN managed.  Order enteric PCR panel with results pending  No indication for C. difficile testing   Enteric precautions for norovirus     Left later knee pressure injury present prior to arrival.   Order WOC consultation.      Incidental finding of nondisplaced 12th rib fracture.   Lidocaine patch, tylenol 650 mg po q4h prn for pain.      Essential hypertension  Hold lisinopril 10 mg daily     Chronic knee arthroplasty infection  On chronic suppressive therapy  Continue cefadroxil 500mg po BID     Parkinson's disease  Order Sinemet  mg 1 tab 3 times daily     Gastroesophageal reflux disease  PTA medication: Omeprazole 20 mg daily as needed     Chronic kidney disease stage III:   Repeat base metabolic profile in  "a.m.     Anemia  Likely due to volume expansion  No signs of losses  Outpatient work-up     Hypertriglyceridemia-not currently on medical management.    Generalized weakness  Needs TCU       Diet: Combination Diet Regular Diet Adult    DVT Prophylaxis: Pneumatic Compression Devices  East Catheter: Not present  Lines: None     Cardiac Monitoring: None  Code Status: Full Code      Clinically Significant Risk Factors                        # Overweight: Estimated body mass index is 25.13 kg/m  as calculated from the following:    Height as of this encounter: 1.676 m (5' 6\").    Weight as of this encounter: 70.6 kg (155 lb 11.2 oz)., PRESENT ON ADMISSION         Disposition Plan      Expected Discharge Date: 02/26/2023    Discharge Delays: Placement - TCU  Destination: home with help/services;home with family  Discharge Comments: Norovirus, PT/OT recommends TCU          Jesse Dsouza MD  Hospitalist Service  [facility]  Securely message with [employer] (more info)  Text page via [employer] Paging/Directory   ______________________________________________________________________    Interval History   Feeling better.  Still feels weak.  He thinks he might need a TCU.  Has been followed by PT and OT.  Discussed his case with his family and they are encouraging him to go to TCU as well.  No pain.  No nausea or vomiting.  No loose stools.    Physical Exam   Vital Signs: Temp: 97.5  F (36.4  C) Temp src: Oral BP: (!) 161/75 Pulse: 84   Resp: 20 SpO2: 97 % O2 Device: None (Room air)    Weight: 155 lbs 11.2 oz    GENERAL:  Alert, appears comfortable, in no acute distress, appears stated age   HEAD:  Normocephalic, without obvious abnormality, atraumatic   EYES:  PERRL, conjunctiva/corneas clear, no scleral icterus, EOM's intact   NECK: Supple, symmetrical, trachea midline   LUNGS:   Clear to auscultation bilaterally, no rales, rhonchi, or wheezing, symmetric chest rise on inhalation, respirations unlabored "   CHEST WALL:  No tenderness or deformity   HEART:  Regular rate and rhythm, S1 and S2 normal, no murmur, rub, or gallop    ABDOMEN:   Soft, non-tender, bowel sounds active all four quadrants, no masses, no organomegaly, no rebound or guarding   EXTREMITIES: Extremities normal, atraumatic, no cyanosis or edema    SKIN: Dry to touch, no exanthems in the visualized areas   NEURO: Alert, oriented x 4, moves all four extremities freely/spontaneously   PSYCH: Cooperative, behavior is appropriate            Data     I have personally reviewed the following data over the past 24 hrs:    12.9 (H)  \   11.1 (L)   / 373     134 (L) 104 19 /  89   3.4 (L) 19 (L) 0.73 \       Imaging results reviewed over the past 24 hrs:   No results found for this or any previous visit (from the past 24 hour(s)).  Recent Labs   Lab 02/26/23  0434 02/25/23  0524 02/24/23  0507   WBC 12.9* 11.4* 21.2*   HGB 11.1* 10.5* 12.5*   MCV 93 95 92    352 473*   * 137 139   POTASSIUM 3.4* 3.5 4.2   CHLORIDE 104 107 102   CO2 19* 21* 21*   BUN 19 28 32*   CR 0.73 0.93 1.22   ANIONGAP 11 9 16   DEVON 8.1* 8.0* 9.5   GLC 89 95 148*

## 2023-02-27 NOTE — PLAN OF CARE
Pt denies pain during shift. He had 5-6 episodes of diarrhea, his anus is red and sore. Ambulates with an assist of 1 and walker. Able to make needs known. BP elevated but decreased gradually. Tolerating a regular diet. IV fluids continued.

## 2023-02-27 NOTE — PROGRESS NOTES
Care Management Follow Up    Length of Stay (days): 3    Expected Discharge Date: 02/28/2023     Concerns to be Addressed: discharge planning     Patient plan of care discussed at interdisciplinary rounds: Yes    Anticipated Discharge Disposition: [facility] (TCU)     Anticipated Discharge Services: None  Anticipated Discharge DME: [facility]    Patient/family educated on Medicare website which has current facility and service quality ratings: yes  Education Provided on the Discharge Plan: yes   Patient/Family in Agreement with the Plan: yes     Referrals Placed by CM/SW: TCU  Private pay costs discussed: Not applicable    Additional Information:  7:53 AM  Chart reviewed. Pt lives in a house with his wife who was reported to have some minor cognitive impairment. Pt and his spouse have been providing support to one another as needed but family reported Pt has been struggling to care for himself recently. TCU was therapies' original recommendations. Most recent notes show recommendations for home PT while OT is recommending home with assist. CM will meet with Pt this morning to discuss the possibility of going home with home care services.    9:25 AM  DIANA met and spoke with Pt regarding discharge planning and changes with therapy recommendations for home care services. Pt reported he is hesitant about going home and worried that he wouldn't have enough support even with home care. Pt reported his wife is weak and would not be able to assist with mobility. Pt requested DIANA to contact his son Lauri or [facility] [employer].     DIANA called and spoke to Lauri about the change in discharge recommendations. Lauri expressed his concern about Pt returning home as Pt's shower is down tall flight of stairs and Pt's wife would not be able to assist Pt with mobility. Lauri requested CM to continue to look for TCU. DIANA stated CM can send referrals but is not sure Pt would qualify or have insurance coverage for Pt's stay. Lauri  stated that he would understand that there may be out of pocket costs for Pt's stay. Lauri requested referrals to TCUs close to [place] if possible.    1:27 PM  DIANA received a VM from Justin at [facility] stating that he could accept Pt and requested CM to call back to discuss when Pt would be ready to discharge.    [employer] authorization submitted. Request ID: [id number].    2:56 PM  Auth status states the submission is pending clinical review. DIANA called and updated Lauri with the progress with Pt's discharge plan. DIANA explained that the insurance prior authorization is pending and discussed the potential out-of-pocket costs for Pt to go if the auth is denied. Lauri reported that he would still think Pt would be most safe going to TCU even if having to pay out-of-pocket. Lauri reports he works tomorrow and will plan to stop by the unit. CM to follow up on discharge coordination tomorrow.     RONALD Rodgers

## 2023-02-27 NOTE — PROGRESS NOTES
[facility]    Medicine Progress Note - Hospitalist Service    Date of Admission:  2/24/2023    Assessment & Plan   Javier Carrillo is a 88 year old male with history of Parkinson's disease, orthostatic hypotension, chronic kidney disease stage III, frequent falls, gastroesophageal reflux disease, essential hypertension, hypertriglyceridemia, admitted on 2/24/2023 for nausea, vomiting, diarrhea, syncopal episode and fall in his home.   Doing better 2/27 with no nausea vomiting and reduced number of loose stools.     Fall initial encounter  Syncope, orthostatic  Elevated D-dimer  Nausea vomiting, diarrhea  Chronic muscle weakness, gait instability.   Rhabdomyolysis, mild.   History of Orthostatic hypotension.  Total ck was elevated >1,000 on admission  and now trending down  Cardiac monitoring  Transthoracic echocardiogram reviewed with small pericardial effusion but preserved EF  Continue fall and aspiration precautions with speech evaluation pending  Order PT, OT  Change IV fluids to normal saline +20 of K  Orthostatic vital signs  relatively normal  Order ondansetron 4 mg IV every 6 hours as needed  FLUVID PCR screen negative  enteric PCR panel positive for norovirus  No indication for C. difficile testing   Enteric precautions for norovirus     Left later knee pressure injury present prior to arrival.   WOC consultation appreciated.     Incidental finding of nondisplaced 12th rib fracture.   Lidocaine patch, tylenol 650 mg po q4h prn for pain.      Essential hypertension  Resume lisinopril 10 mg daily     Chronic knee arthroplasty infection  On chronic suppressive therapy  Continue cefadroxil 500mg po BID     Parkinson's disease  Order Sinemet  mg 1 tab 3 times daily     Gastroesophageal reflux disease  PTA medication: Omeprazole 20 mg daily as needed     Chronic kidney disease stage III:   Repeat base metabolic profile in a.m.     Anemia  Likely due to volume expansion  No signs of  losses  Outpatient work-up     Hypertriglyceridemia-not currently on medical management.    Generalized weakness  Needs TCU       Diet: Combination Diet Regular Diet Adult    DVT Prophylaxis: Pneumatic Compression Devices  East Catheter: Not present  Lines: None     Cardiac Monitoring: None  Code Status: Full Code        Disposition Plan      Expected Discharge Date: 02/28/2023    Discharge Delays: Placement - TCU  Destination: home with help/services;home with family  Discharge Comments: Norovirus, PT/OT recommends home PT - family requesting TCU        The patient's care was discussed with the Patient.    Clotilde GONZALEZ Student  Hospitalist Service  [facility]  Securely message with [employer] (more info)  Text page via [employer] Paging/Directory   ______________________________________________________________________    Interval History   Pt reports feeling better this morning. No nausea or vomiting. He had frequent loose stools overnight but they have lessened this morning. Has been followed by PT and OT. Pt understands the plan to discharge to TCU due to continued weakness when placement is found. Pt concerned over high blood pressure readings but informed this is safer than hypotension due to his history of orthostatic hypotension.     Physical Exam   Vital Signs: Temp: 97.8  F (36.6  C) Temp src: Oral BP: (!) 151/69 Pulse: 84   Resp: 20 SpO2: 98 % O2 Device: None (Room air)    Weight: 151 lbs 9.6 oz    GENERAL:  Alert, appears comfortable, in no acute distress, appears stated age   HEAD:  Normocephalic, without obvious abnormality, atraumatic   EYES:  PERRL, conjunctiva/corneas clear, no scleral icterus, EOM's intact   NECK: Supple, symmetrical, trachea midline   LUNGS:   Clear to auscultation bilaterally, no rales, rhonchi, or wheezing, symmetric chest rise on inhalation, respirations unlabored   CHEST WALL:  No tenderness or deformity   HEART:  Regular rate and rhythm, S1 and S2  normal, no murmur, rub, or gallop    ABDOMEN:   Soft, non-tender, bowel sounds active all four quadrants, no masses, no organomegaly, no rebound or guarding   EXTREMITIES: Extremities normal, atraumatic, no cyanosis or edema    SKIN: Dry to touch, no exanthems in the visualized areas   NEURO: Alert, oriented x 4, moves all four extremities freely/spontaneously   PSYCH: Cooperative, behavior is appropriate              Data         Imaging results reviewed over the past 24 hrs:   No results found for this or any previous visit (from the past 24 hour(s)).  Recent Labs   Lab 02/26/23  0434 02/25/23  0524 02/24/23  0507   WBC 12.9* 11.4* 21.2*   HGB 11.1* 10.5* 12.5*   MCV 93 95 92    352 473*   * 137 139   POTASSIUM 3.4* 3.5 4.2   CHLORIDE 104 107 102   CO2 19* 21* 21*   BUN 19 28 32*   CR 0.73 0.93 1.22   ANIONGAP 11 9 16   DEVON 8.1* 8.0* 9.5   GLC 89 95 148*

## 2023-02-27 NOTE — PLAN OF CARE
Patient with several loose stools during the shift. Tolerating transfers with no signs of orthostatic hypotension. Denies pain. Vital signs stable. Remains free from injury. Continuing to follow plan of care.     Problem: Risk for Delirium  Goal: Improved Sleep  Outcome: Not Progressing     Problem: Diarrhea  Goal: Effective Diarrhea Management  Outcome: Not Progressing     Problem: Plan of Care - These are the overarching goals to be used throughout the patient stay.    Goal: Absence of Hospital-Acquired Illness or Injury  Outcome: Progressing  Intervention: Prevent Skin Injury  Recent Flowsheet Documentation  Taken 2/27/2023 0400 by Molly Costello, RN  Body Position: position changed independently  Taken 2/27/2023 0000 by Molly Costello, RN  Body Position: position changed independently  Taken 2/26/2023 2000 by Molly Costello, RN  Body Position: position changed independently  Goal: Optimal Comfort and Wellbeing  Outcome: Progressing     Problem: Risk for Delirium  Goal: Optimal Coping  Outcome: Progressing   Goal Outcome Evaluation:

## 2023-02-28 NOTE — PROGRESS NOTES
Care Management Follow Up    Length of Stay (days): 4    Expected Discharge Date: 02/28/2023     Concerns to be Addressed: discharge planning     Patient plan of care discussed at interdisciplinary rounds: Yes    Anticipated Discharge Disposition: Home Care     Anticipated Discharge Services: None  Anticipated Discharge DME: [facility]    Patient/family educated on Medicare website which has current facility and service quality ratings: no  Education Provided on the Discharge Plan:    Patient/Family in Agreement with the Plan: yes (Family is in agreement with pt returning home if provided with sufficient support in doing so.)    Referrals Placed by CM/DIANA:  Home care  Private pay costs discussed: Not applicable    Additional Information:  8:16 AM  Plan is for Pt to discharge to [facility] today. Pt has a long set of stairs in his home that he is required to go down to use the shower and Pt would not have the necessary support to ensure safety with Pt returning home. BRITTANI submitted an [employer] prior authorization (Reference ID: [id number]) yesterday. DIANA checked status of the prior authorization and it current is In MD Review.   DIANA discussed TCU with Pt's son Lauri yesterday and discussed the possibility of insurance not approving the authorization due to therapy recommending home with home care and home with assist. Lauri states Pt's spouse is weak and would not be able to assist Pt with mobility.  Lauri states that he would be willing to pay privately for Pt to go to TCU for a short period to ensure he is strong enough to return to his home. Justin at [facility] informed BRITTANI yesterday that paying privately would require a $7000 deposit and an estimated $300-$400 per day.     9:07 AM  DIANA met and spoke with Pt's son Lauri outside of Pt's room regarding the insurance authorization denial. Lauri stated that he wants to discuss the information with his family and should be able to give CM an update by noon.      DIANA spoke to Justin at [facility] who reported that they would not have a bed for Pt today due to several other admissions occurring today. DIANA updated Pt's care team.    DIANA received a message from Sachi at [facility] who reported that she has open beds today. DIANA stated that CM will reach out to Pt's son to discuss.     11:06 AM  DIANA spoke to Lauri about [facility] not having a bed today but [facility] could offer a bed. Lauri asked about having home care services coordinated for Pt instead of TCU. DIANA stated that CM can send referrals for home care services and Pt would be able to discharge home today. Home care referrals sent.     3:01 PM  [facility] accepted but reports they would be unable to start care until next week. DIANA spoke with Lauri who reported that Pt will need support sooner than that. DIANA stated that CM will request the [employer] hub to send further referrals to find an agency with an earlier start of care date.     RONALD Rodgers

## 2023-02-28 NOTE — PLAN OF CARE
Goal Outcome Evaluation:    Patient VSS, RA, placed on tele, NSR. Denies pain, rested with eyes closed overnight. No diarrhea or incontinence overnight. Regular output using urinal during shift. Calls appropriately. Ax1 gaitbelt and walker to stand to use urinal.     Problem: Risk for Delirium  Goal: Improved Sleep  Outcome: Progressing     Problem: Diarrhea  Goal: Effective Diarrhea Management  Intervention: Manage Diarrhea  Recent Flowsheet Documentation  Taken 2/28/2023 0300 by Lamberto Hollis, RN  Medication Review/Management: medications reviewed  Taken 2/27/2023 2340 by Lamberto Hollis, RN  Medication Review/Management: medications reviewed  Taken 2/27/2023 1942 by Lamberto Hollis, RN  Medication Review/Management: medications reviewed     Problem: Plan of Care - These are the overarching goals to be used throughout the patient stay.    Goal: Optimal Comfort and Wellbeing  Outcome: Progressing

## 2023-02-28 NOTE — PROGRESS NOTES
[facility]    Medicine Progress Note - Hospitalist Service    Date of Admission:  2/24/2023    Assessment & Plan   Javier Carrillo is a 88 year old male with history of Parkinson's disease, orthostatic hypotension, chronic kidney disease stage III, frequent falls, gastroesophageal reflux disease, essential hypertension, hypertriglyceridemia, admitted on 2/24/2023 for nausea, vomiting, diarrhea, syncopal episode and fall in his home.   These symptoms have much improved since admission though still having occasional loose stool and complaining of weakness. Plan to discharge to TCU tomorrow.      Fall initial encounter  Syncope, orthostatic  Elevated D-dimer  Norovirus infection  Chronic muscle weakness, gait instability.   Rhabdomyolysis, mild.   History of Orthostatic hypotension.  Transthoracic echocardiogram reviewed with small pericardial effusion but preserved EF. Orthostatic vital signs  relatively normal. Total ck was elevated >1,000 on admission  and now trending down. FLUVID PCR screen negative.  Syncope/fall could have been caused by his underlying Parkinson's disease with changes in autonomic function or orthostatic hypotension from dehydration secondary to the acute norovirus infection.  Cardiac monitoring  Continue fall precautions   PT, OT  Ondansetron 4 mg IV every 6 hours as needed  Enteric precautions for norovirus    Hypokalemia  Replacement protocol     Left later knee pressure injury present prior to arrival.   WOC consultation appreciated.     Incidental finding of nondisplaced 12th rib fracture.   Lidocaine patch, tylenol 650 mg po q4h prn for pain.      Essential hypertension  Resume lisinopril 10 mg daily     Chronic knee arthroplasty infection  On chronic suppressive therapy  Continue cefadroxil 500mg po BID     Parkinson's disease  Order Sinemet  mg 1 tab 3 times daily     Gastroesophageal reflux disease  PTA medication: Omeprazole 20 mg daily as  needed     Chronic kidney disease stage III:   Repeat base metabolic profile in a.m.     Anemia  Likely due to volume expansion  No signs of losses  Outpatient work-up     Hypertriglyceridemia-not currently on medical management.    Generalized weakness  Needs TCU         Diet: Combination Diet Regular Diet Adult    DVT Prophylaxis: Pneumatic Compression Devices  East Catheter: Not present  Lines: None     Cardiac Monitoring: None  Code Status: Full Code      Disposition Plan      Expected Discharge Date: 03/01/2023    Discharge Delays: *Medically Ready for Discharge  Destination: home with help/services;home with family  Discharge Comments: Norovirus, Accepted at TCU, waiting for insurance approval- declined. may be private pay        The patient's care was discussed with the Attending Physician, Dr. Dsouza.    Petros Holloway MD  Hospitalist Service  [facility]  Securely message with [employer] (more info)  Text page via [employer] Paging/Directory   ______________________________________________________________________    Interval History   Patient reports 1 episode of diarrhea this morning.  Still endorsing general weakness.  He stated that his work with PT and OT yesterday went well.  Not having any symptoms of dizziness or lightheadedness.  Feeling a little shaky this morning but he attributes this to not eating that much recently.    Physical Exam   Vital Signs: Temp: 97.8  F (36.6  C) Temp src: Oral BP: 107/56 Pulse: 82   Resp: 18 SpO2: 98 % O2 Device: None (Room air)    Weight: 152 lbs 1.88 oz    Constitutional: awake, alert, cooperative, no apparent distress, and appears stated age  Respiratory: No increased work of breathing, good air exchange, clear to auscultation bilaterally, no crackles or wheezing  Cardiovascular: Regular rate and rhythm, normal S1 and S2, no S3 or S4, and no murmur noted  Musculoskeletal: There is no redness, warmth, or swelling of the joints.  No peripheral edema.    Neuropsychiatric: General: normal, calm and normal eye contact  Level of consciousness: alert / normal  Affect: normal    Data     I have personally reviewed the following data over the past 24 hrs:    10.7  \   10.1 (L)   / 335     136 105 11 / 88   2.9 (L) 23 0.69 (L) \       Imaging results reviewed over the past 24 hrs:   No results found for this or any previous visit (from the past 24 hour(s)).     2/28/2023   [facility] :Faculty Attestation   I have seen and examined the patient.   I have discussed the case with the resident physician(s), Dr. Petros Holloway  I agree with the findings, assessment and plan.   Jesse Dsouza MD

## 2023-02-28 NOTE — PLAN OF CARE
VSS, SBP elevated to 160s. Remains on RA, lungs dim but clear. Right PIV remains patent w/ NS+20K @ 50mL/hr. Blood cultures resulted negative. Pt has been accepted at [facility], pending insurance auth. Plan has been communicated with Lauri and Teresa (children), questions answered, understanding confirmed.

## 2023-03-01 NOTE — PLAN OF CARE
VSS, SBP elevated to 160s. A&O. Remains on RA, lungs remain clear but dim, improved. PIV remains patent in right forearm- SL. Potassium replaced per protocol. Tolerating stdby ambulation in room and hallway, seen by PT/OT. Care plan discussed with pt and family (Lauri and Teresa) by nurse and , questions answered, waiting for home care availability. Likely to discharge tmrw.

## 2023-03-01 NOTE — PLAN OF CARE
Goal: Optimal Comfort and Wellbeing  Outcome: Progressing     Problem: Diarrhea  Goal: Effective Diarrhea Management  Outcome: Progressing     Problem: Fatigue  Goal: Improved Activity Tolerance  Outcome: Progressing     Pt denies pain. No diarrhea overnight, able to stand at the bedside to use urinal. Plan is to go home with home care per  note. Pt resting, will continue to monitor.

## 2023-03-01 NOTE — PROGRESS NOTES
Care Management Discharge Note    Discharge Date: 03/01/2023       Discharge Disposition: Home, Home Care    Discharge Services: None    Discharge DME: Walker, Raised Toilet Seat (gait belt)    Discharge Transportation: family or friend will provide    Private pay costs discussed: Not applicable    PAS Confirmation Code:  (not applicable)  Patient/family educated on Medicare website which has current facility and service quality ratings: yes    Education Provided on the Discharge Plan:    Persons Notified of Discharge Plans: son  Patient/Family in Agreement with the Plan: yes (Family is in agreement with pt returning home if provided with sufficient support in doing so.)    Handoff Referral Completed: yes    Additional Information:    9:52 AM  DIANA discussed updates with MD.  Anticipate Pt will return home with family assistance and home care services at time of discharge.  Awaiting response from [facility] to determine SOC for RN, PT, OT services.  DIANA spoke with sonLauri, who confirms agreement with discharge plan.  Lauri requests walker, gait belt, and toilet seat riser at discharge.  DIANA spoke with PT who reports they will be able to provide these requested items.    Family to transport between [phone].     12:25 PM  [facility] has accepted Pt for PT, OT, RN services with start of care 3/3 vs 3/4.  [facility] will pull orders from Epic when available. DIANA updated Pt's son.    RONALD Stiles

## 2023-03-01 NOTE — PROGRESS NOTES
Occupational Therapy Discharge Summary    Reason for therapy discharge:    Discharged to home with home therapy.    Progress towards therapy goal(s). See goals on Care Plan in [facility] electronic health record for goal details.  Goals met    Therapy recommendation(s):    Continued therapy is recommended.  Rationale/Recommendations:  home OT assessment to assure safe trsfs, ADLs and placement of adaptive equipment.

## 2023-03-01 NOTE — DISCHARGE SUMMARY
[facility]  Hospitalist Discharge Summary      Date of Admission:  2/24/2023  Date of Discharge:  3/1/2023  Discharging Provider: Petros Holloway MD  Discharge Service: Hospitalist Service    Discharge Diagnoses   Syncope work-up  Fall at home  Orthostatic hypotension  Rhabdomyolysis  Generalized weakness  Norovirus      Follow-ups Needed After Discharge   Follow-up Appointments     Follow-up and recommended labs and tests       Follow up with primary care provider, Pancho Nunes, within 7 days for   hospital follow- up.  The following labs/tests are recommended: Recheck   electrolytes. Anemia work up if not previously worked up.              Unresulted Labs Ordered in the Past 30 Days of this Admission     Date and Time Order Name Status Description    3/1/2023  7:19 AM Potassium In process       These results will be followed up by PCP    Discharge Disposition   Discharged to home with home care  Condition at discharge: Stable      Hospital Course   Javier Carrillo is a 88 year old male with history of Parkinson's disease, orthostatic hypotension, chronic kidney disease stage III, frequent falls, gastroesophageal reflux disease, essential hypertension, hypertriglyceridemia, admitted on 2/24/2023 for nausea, vomiting, diarrhea, syncopal episode and fall in his home.   These symptoms have much improved since admission with cessation of loose stools and improvement of weakness. Plan to discharge to home with home care.      Fall initial encounter  Syncope, orthostatic  Elevated D-dimer  Norovirus infection  Chronic muscle weakness, gait instability.   Rhabdomyolysis, mild.   History of Orthostatic hypotension.  Transthoracic echocardiogram reviewed with small pericardial effusion but preserved EF. Orthostatic vital signs  relatively normal. Total ck was elevated >1,000 on admission  and now trending down. FLUVID PCR screen negative.  Syncope/fall could have been caused by his underlying  Parkinson's disease with changes in autonomic function or orthostatic hypotension from dehydration secondary to the acute norovirus infection.  Home Referral: PT, OT, RN    Hypokalemia  Follow-up outpatient     Left later knee pressure injury present prior to arrival.   WOC was consulted during this hospitalization.     Incidental finding of nondisplaced 12th rib fracture.   Pain well controlled prior to discharge.     Essential hypertension  Resume lisinopril 10 mg daily     Chronic knee arthroplasty infection  On chronic suppressive therapy  Resume cefadroxil 500mg po BID     Parkinson's disease  Resume Sinemet  mg 1 tab 3 times daily     Gastroesophageal reflux disease  Resume omeprazole 20 mg daily as needed     Chronic kidney disease stage III:   Creatinine stable prior to discharge     Anemia  Likely due to volume expansion  No signs of losses  Outpatient work-up     Generalized weakness  Home care referral ordered at discharge.        Consultations This Hospital Stay   WOUND OSTOMY CONTINENCE NURSE  IP CONSULT  SPEECH LANGUAGE PATH ADULT IP CONSULT  CARE MANAGEMENT / SOCIAL WORK IP CONSULT  PHYSICAL THERAPY ADULT IP CONSULT  OCCUPATIONAL THERAPY ADULT IP CONSULT    Code Status   Full Code    Petros Holloway MD  [address] [address]  Phone: [phone]  Fax: [phone]  ______________________________________________________________________    Physical Exam   Vital Signs: Temp: 97.5  F (36.4  C) Temp src: Oral BP: 131/68 Pulse: 95   Resp: 16 SpO2: 98 % O2 Device: None (Room air)    Weight: 149 lbs 12.8 oz  Constitutional: awake, alert, cooperative, no apparent distress, and appears stated age  Respiratory: No increased work of breathing, good air exchange, clear to auscultation bilaterally, no crackles or wheezing  Cardiovascular: Regular rate and rhythm, normal S1 and S2, no S3 or S4, and no murmur noted  Musculoskeletal: no lower  extremity pitting edema present  Neurologic: Cranial Nerves:  cranial nerves II-XII are grossly intact  Neuropsychiatric: General: normal, calm and normal eye contact  Level of consciousness: alert / normal  Affect: normal       Primary Care Physician   Pancho Nunes    Discharge Orders      Home Care Referral      Reason for your hospital stay    Syncope work-up.  Dehydration caused by norovirus infection.     Follow-up and recommended labs and tests     Follow up with primary care provider, Pancho Nunes, within 7 days for hospital follow- up.  The following labs/tests are recommended: Recheck electrolytes. Anemia work up if not previously worked up.     Activity    Your activity upon discharge: activity as tolerated     Diet    Follow this diet upon discharge: Regular       Significant Results and Procedures   Most Recent 3 CBC's:Recent Labs   Lab Test 03/01/23  0439 02/28/23  0457 02/26/23  0434 02/25/23  0524   WBC  --  10.7 12.9* 11.4*   HGB 10.3* 10.1* 11.1* 10.5*   MCV  --  90 93 95   PLT  --  335 373 352     Most Recent 3 BMP's:Recent Labs   Lab Test 03/01/23 0439 02/28/23  1950 02/28/23  1411 02/28/23 0457 02/26/23  0434   *  --   --  136 134*   POTASSIUM 3.3* 3.8 3.4* 2.9* 3.4*   CHLORIDE 103  --   --  105 104   CO2 24  --   --  23 19*   BUN 15  --   --  11 19   CR 0.72  --   --  0.69* 0.73   ANIONGAP 6  --   --  8 11   DEVON 8.2*  --   --  8.1* 8.1*   GLC 90  --   --  88 89       Discharge Medications   Current Discharge Medication List      CONTINUE these medications which have NOT CHANGED    Details   acetaminophen (TYLENOL) 500 MG tablet [ACETAMINOPHEN (TYLENOL) 500 MG TABLET] Take 1,000 mg by mouth every 6 (six) hours as needed for pain.      calcium carbonate-vitamin D3 (OS-DEVON 250+ D) 250-125 mg-unit Tab per tablet [CALCIUM CARBONATE-VITAMIN D3 (OS-DEVON 250+ D) 250-125 MG-UNIT TAB PER TABLET] Take 1 tablet by mouth daily.      calcium, as carbonate, (TUMS) 200 mg calcium (500 mg) chewable  tablet [CALCIUM, AS CARBONATE, (TUMS) 200 MG CALCIUM (500 MG) CHEWABLE TABLET] Chew 2 tablets every 2 (two) hours as needed for heartburn.       carbidopa-levodopa (SINEMET)  MG tablet Take 1 tablet by mouth 3 times daily      cefadroxil (DURICEF) 500 MG capsule Take 500 mg by mouth 2 times daily      co-enzyme Q-10 100 MG CAPS capsule Take 100 mg by mouth daily      lisinopril (ZESTRIL) 10 MG tablet Take 10 mg by mouth daily      melatonin 3 mg Tab tablet Take 3 mg by mouth nightly as needed      omeprazole (PRILOSEC) 20 MG DR capsule Take 20 mg by mouth daily as needed           Allergies   Allergies   Allergen Reactions     Levaquin [Levofloxacin] Unknown     Tendon pain     3/1/2023   [facility] :Faculty Attestation   I have seen and examined the patient.   I have discussed the case with the resident physician(s), Dr. Petros Holloway.  I agree with the findings, assessment and plan.   Jesse Dsouza MD

## 2023-03-01 NOTE — PLAN OF CARE
Discharge instructions discussed with patient and Son at bedside; verbalized understanding. Discharging to home with son to transport. Patient is stable at the time of discharge.

## 2023-03-01 NOTE — PROGRESS NOTES
Physical Therapy Discharge Summary    Reason for therapy discharge:    Discharged to home with home therapy.    Progress towards therapy goal(s). See goals on Care Plan in [facility] electronic health record for goal details.  Goals partially met.  Barriers to achieving goals:   SBA.    Therapy recommendation(s):    Continued therapy is recommended.  Rationale/Recommendations:  Home PT.

## 2023-03-02 NOTE — PROGRESS NOTES
Clinic Care Coordination Contact  [facility]: Post-Discharge Note  SITUATION                                                      Admission:    Admission Date: 02/24/23   Reason for Admission: Syncope work-up  Fall at home  Orthostatic hypotension  Rhabdomyolysis  Generalized weakness  Norovirus  Discharge:   Discharge Date: 03/01/23  Discharge Diagnosis: Syncope work-up  Fall at home  Orthostatic hypotension  Rhabdomyolysis  Generalized weakness  Norovirus    BACKGROUND                                                      Per hospital discharge summary and inpatient provider notes:    Javier Carrillo is a 88 year old male with history of Parkinson's disease, orthostatic hypotension, chronic kidney disease stage III, frequent falls, gastroesophageal reflux disease, essential hypertension, hypertriglyceridemia, admitted on 2/24/2023 for nausea, vomiting, diarrhea, syncopal episode and fall in his home.   These symptoms have much improved since admission with cessation of loose stools and improvement of weakness. Plan to discharge to home with home care.     ASSESSMENT           Discharge Assessment  How are you doing now that you are home?: doing well, Pt stated that he has home nursing taking care of him.  How are your symptoms? (Red Flag symptoms escalate to triage hotline per guidelines): Improved  Do you feel your condition is stable enough to be safe at home until your provider visit?: Yes  Does the patient have their discharge instructions? : Yes  Does the patient have questions regarding their discharge instructions? : No  Were you started on any new medications or were there changes to any of your previous medications? : Yes  Does the patient have all of their medications?: Yes  Do you have questions regarding any of your medications? : No  Do you have all of your needed medical supplies or equipment (DME)?  (i.e. oxygen tank, CPAP, cane, etc.): Yes  Discharge follow-up appointment scheduled within 14  calendar days? : Yes  Discharge Follow Up Appointment Date: 03/22/23  Discharge Follow Up Appointment Scheduled with?: Specialty Care Provider    Post-op ([facility] Only)  If the patient had a surgery or procedure, do they have any questions for a nurse?: No         PLAN                                                      Outpatient Plan:    Follow-up Appointments     Follow-up and recommended labs and tests       Follow up with primary care provider, Pancho Nunes, within 7 days for   hospital follow- up.  The following labs/tests are recommended: Recheck   electrolytes. Anemia work up if not previously worked up.    Future Appointments   Date Time Provider Department Center   3/22/2023 10:20 AM Amaya Aden MD [facility]     For any urgent concerns, please contact our 24 hour nurse triage line: [phone] ([address] [place] [address])       SEAN Anderson  [phone]  [facility]

## 2023-03-22 NOTE — LETTER
3/22/2023    Pancho Nunes MD  [facility] [address] [facility] [address]    RE: Javier Carrillo       Dear Colleague,     I had the pleasure of seeing Javier Carrillo in the [facility].    HEART CARE ENCOUNTER CONSULTATON NOTE      [facility]  [phone]      Assessment/Recommendations   Assessment:  1.  Orthostatic hypotension: We will allow for slightly higher systemic pressures given issues with orthostasis likely exacerbated by autonomic dysfunction and recent significant weight loss.  Counseled on increased fluids, compression stockings, replete electrolytes.  2.  Parkinson's disease  3.  Hypertension: Well-controlled  4.  GERD  5.  Coronary disease: Severe detected on CT.  Stress test in 2018 showed no evidence of inducible ischemia.  No anginal complaints.    Plan:  1. Decrease lisinopril to 5 mg daily  2. Knee high compression stockings  3. Keep up with fluids, electrolytes  4. If blood pressures still low, feeling light headed then will want to stop the lisinopril       History of Present Illness/Subjective    HPI: Javier Carrillo is a 88 year old male with history of Parkinson's disease, hypertension, GERD, coronary artery disease detected on CT and orthostasis who I am seeing today to reestablish care.  Have not seen him since 2018.  At that time I saw him for episodes of presyncope on standing.  I stopped his hydrochlorothiazide.  In the interim he had restarted the hydrochlorothiazide saw my partner a few years ago for similar symptoms.  His medications were again adjusted.  He developed viral GI infection with nausea vomiting and diarrhea.  He suffered a syncopal episode at home felt to be secondary to dehydration and also underlying Parkinson's disease with changes in autonomic function.  He was discharged on his same home dose of lisinopril 10 mg daily.  Blood pressure is low today.  He brought in his blood pressures and is  "here today with his son who works in the CT department here at [facility].  He overall feels well but he has noted that he has loss of appetite and has lost about 40 pounds over the past couple years.  He denies any exertional chest pain or breathing difficulty.  He now uses a walker for stability.  Blood pressures were overall well controlled however on the 20th couple days ago he had a blood pressure of 97/57 and felt overall weak that day.  His blood pressure is again low today and he feels weak.  On standing his blood pressure dropped slightly to 88/50.         Physical Examination  Review of Systems   Vitals: BP 92/50 (BP Location: Left arm, Patient Position: Sitting, Cuff Size: Adult Regular)   Pulse 92   Resp 16   Wt 67.1 kg (148 lb)   SpO2 100%   BMI 23.89 kg/m    BMI= Body mass index is 23.89 kg/m .  Wt Readings from Last 3 Encounters:   03/22/23 67.1 kg (148 lb)   03/01/23 67.9 kg (149 lb 12.8 oz)   03/30/20 80.3 kg (177 lb)       General Appearance:   no distress, normal body habitus   ENT/Mouth: membranes moist, no oral lesions or bleeding gums.      EYES:  no scleral icterus, normal conjunctivae   Neck: no carotid bruits or thyromegaly   Chest/Lungs:   lungs are clear to auscultation   Cardiovascular:   Regular. Normal first and second heart sounds with no murmurs  no edema bilaterally    Abdomen:  no organomegaly, masses, bruits, or tenderness; bowel sounds are present   Extremities: no cyanosis or clubbing   Skin: no xanthelasma, warm.    Neurologic: normal  bilateral, no tremors     Psychiatric: alert and oriented x3, calm        Please refer above for cardiac ROS details.        Medical History  Surgical History Family History Social History   Past Medical History:   Diagnosis Date     Acute hyponatremia      GUSTAVO (acute kidney injury) (H)      Chronic kidney disease     \"stage 3 kidney\"     GERD (gastroesophageal reflux disease)      Hearing loss      History of transfusion      HTN " (hypertension)      Hypertriglyceridemia      Melanoma (H) 05/2015    Rt Bicep     Septicemia (H)      SVT (supraventricular tachycardia) (H) 1999     Traumatic rhabdomyolysis (H)      Past Surgical History:   Procedure Laterality Date     CATARACT EXTRACTION Bilateral      JOINT REPLACEMENT       OTHER SURGICAL HISTORY Left     Laser retinal surgery     OTHER SURGICAL HISTORY Left 03/10/2020    IRRIGATION AND DEBRIDEMENT KNEE ARTHROPLASTY/ Poly Exchange      AR ESOPHAGOGASTRODUODENOSCOPY TRANSORAL DIAGNOSTIC N/A 3/19/2020    Procedure: ESOPHAGOGASTRODUODENOSCOPY (EGD);  Surgeon: Joby Brice MD;  Location: [facility] Main OR;  Service: Gastroenterology     REPLACEMENT TOTAL KNEE Left      RETINAL DETACHMENT SURGERY Right 1980     TOTAL KNEE ARTHROPLASTY Right      Family History   Problem Relation Age of Onset     Heart Failure Sister 80.00     Pancreatic Cancer Brother         Social History     Socioeconomic History     Marital status:      Spouse name: Not on file     Number of children: Not on file     Years of education: Not on file     Highest education level: Not on file   Occupational History     Not on file   Tobacco Use     Smoking status: Never     Smokeless tobacco: Never   Substance and Sexual Activity     Alcohol use: No     Drug use: No     Sexual activity: Never   Other Topics Concern     Not on file   Social History Narrative     Not on file     Social Determinants of Health     Financial Resource Strain: Not on file   Food Insecurity: Not on file   Transportation Needs: Not on file   Physical Activity: Not on file   Stress: Not on file   Social Connections: Not on file   Intimate Partner Violence: Not on file   Housing Stability: Not on file           Medications  Allergies   Current Outpatient Medications   Medication Sig Dispense Refill     acetaminophen (TYLENOL) 500 MG tablet [ACETAMINOPHEN (TYLENOL) 500 MG TABLET] Take 1,000 mg by mouth every 6 (six) hours as needed for pain.        calcium carbonate-vitamin D3 (OS-DEVON 250+ D) 250-125 mg-unit Tab per tablet [CALCIUM CARBONATE-VITAMIN D3 (OS-DEVON 250+ D) 250-125 MG-UNIT TAB PER TABLET] Take 1 tablet by mouth daily.       calcium, as carbonate, (TUMS) 200 mg calcium (500 mg) chewable tablet [CALCIUM, AS CARBONATE, (TUMS) 200 MG CALCIUM (500 MG) CHEWABLE TABLET] Chew 2 tablets every 2 (two) hours as needed for heartburn.        carbidopa-levodopa (SINEMET)  MG tablet Take 1 tablet by mouth 3 times daily       cefadroxil (DURICEF) 500 MG capsule Take 500 mg by mouth 2 times daily       co-enzyme Q-10 100 MG CAPS capsule Take 100 mg by mouth daily       lisinopril (ZESTRIL) 5 MG tablet Take 1 tablet (5 mg) by mouth daily 90 tablet 3     omeprazole (PRILOSEC) 20 MG DR capsule Take 20 mg by mouth daily as needed       melatonin 3 mg Tab tablet Take 3 mg by mouth nightly as needed (Patient not taking: Reported on 3/22/2023)         Allergies   Allergen Reactions     Levaquin [Levofloxacin] Unknown     Tendon pain          Lab Results    Chemistry/lipid CBC Cardiac Enzymes/BNP/TSH/INR   Recent Labs   Lab Test 09/23/22  1519   CHOL 146   HDL 43   LDL 86   TRIG 85     Recent Labs   Lab Test 09/23/22  1519 07/23/18  1519 01/16/15  1413   LDL 86 89 88     Recent Labs   Lab Test 03/01/23  1212 03/01/23 0439   NA  --  133*   POTASSIUM 3.9 3.3*   CHLORIDE  --  103   CO2  --  24   GLC  --  90   BUN  --  15   CR  --  0.72   GFRESTIMATED  --  88   DEVON  --  8.2*     Recent Labs   Lab Test 03/01/23 0439 02/28/23 0457 02/26/23 0434   CR 0.72 0.69* 0.73     No results for input(s): A1C in the last 46443 hours.       Recent Labs   Lab Test 03/01/23 0439 02/28/23 0457   WBC  --  10.7   HGB 10.3* 10.1*   HCT  --  30.4*   MCV  --  90   PLT  --  335     Recent Labs   Lab Test 03/01/23  0439 02/28/23  0457 02/26/23  0434   HGB 10.3* 10.1* 11.1*    Recent Labs   Lab Test 02/24/23  0507 03/18/20  1940 02/14/20  1324   TROPONINI 0.05 <0.01 <0.01     Recent Labs    Lab Test 03/18/20 1940 07/17/18  1134   BNP 57 <10     Recent Labs   Lab Test 09/23/22  1519   TSH 1.41     Recent Labs   Lab Test 03/18/20 1940 07/17/18  0842   INR 1.12* 1.02        Amaya Aden MD       Thank you for allowing me to participate in the care of your patient.      Sincerely,   Amaya Aden MD   [facility]  cc: No referring provider defined for this encounter.

## 2023-03-22 NOTE — PATIENT INSTRUCTIONS
Javier ROGER Carrillo,     It was a pleasure to see you in the office today. My recommendations for you include:   1. Decrease lisinopril to 5 mg daily  2. Knee high compression stockings  3. Keep up with fluids, electrolytes  4. If blood pressures still low, feeling light headed then will want to stop the lisinopril     Please do not hesitate to call the [facility] clinic with any questions or concerns at [phone].    Sincerely,     Amaya Aden MD

## 2023-03-22 NOTE — PROGRESS NOTES
HEART CARE ENCOUNTER CONSULTATON NOTE      [facility]  [phone]      Assessment/Recommendations   Assessment:  1.  Orthostatic hypotension: We will allow for slightly higher systemic pressures given issues with orthostasis likely exacerbated by autonomic dysfunction and recent significant weight loss.  Counseled on increased fluids, compression stockings, replete electrolytes.  2.  Parkinson's disease  3.  Hypertension: Well-controlled  4.  GERD  5.  Coronary disease: Severe detected on CT.  Stress test in 2018 showed no evidence of inducible ischemia.  No anginal complaints.    Plan:  1. Decrease lisinopril to 5 mg daily  2. Knee high compression stockings  3. Keep up with fluids, electrolytes  4. If blood pressures still low, feeling light headed then will want to stop the lisinopril       History of Present Illness/Subjective    HPI: Javier Carrillo is a 88 year old male with history of Parkinson's disease, hypertension, GERD, coronary artery disease detected on CT and orthostasis who I am seeing today to reestablish care.  Have not seen him since 2018.  At that time I saw him for episodes of presyncope on standing.  I stopped his hydrochlorothiazide.  In the interim he had restarted the hydrochlorothiazide saw my partner a few years ago for similar symptoms.  His medications were again adjusted.  He developed viral GI infection with nausea vomiting and diarrhea.  He suffered a syncopal episode at home felt to be secondary to dehydration and also underlying Parkinson's disease with changes in autonomic function.  He was discharged on his same home dose of lisinopril 10 mg daily.  Blood pressure is low today.  He brought in his blood pressures and is here today with his son who works in the CT department here at [facility].  He overall feels well but he has noted that he has loss of appetite and has lost about 40 pounds over the past couple years.  He denies any exertional chest pain or  "breathing difficulty.  He now uses a walker for stability.  Blood pressures were overall well controlled however on the 20th couple days ago he had a blood pressure of 97/57 and felt overall weak that day.  His blood pressure is again low today and he feels weak.  On standing his blood pressure dropped slightly to 88/50.         Physical Examination  Review of Systems   Vitals: BP 92/50 (BP Location: Left arm, Patient Position: Sitting, Cuff Size: Adult Regular)   Pulse 92   Resp 16   Wt 67.1 kg (148 lb)   SpO2 100%   BMI 23.89 kg/m    BMI= Body mass index is 23.89 kg/m .  Wt Readings from Last 3 Encounters:   03/22/23 67.1 kg (148 lb)   03/01/23 67.9 kg (149 lb 12.8 oz)   03/30/20 80.3 kg (177 lb)       General Appearance:   no distress, normal body habitus   ENT/Mouth: membranes moist, no oral lesions or bleeding gums.      EYES:  no scleral icterus, normal conjunctivae   Neck: no carotid bruits or thyromegaly   Chest/Lungs:   lungs are clear to auscultation   Cardiovascular:   Regular. Normal first and second heart sounds with no murmurs  no edema bilaterally    Abdomen:  no organomegaly, masses, bruits, or tenderness; bowel sounds are present   Extremities: no cyanosis or clubbing   Skin: no xanthelasma, warm.    Neurologic: normal  bilateral, no tremors     Psychiatric: alert and oriented x3, calm        Please refer above for cardiac ROS details.        Medical History  Surgical History Family History Social History   Past Medical History:   Diagnosis Date     Acute hyponatremia      GUSTAVO (acute kidney injury) (H)      Chronic kidney disease     \"stage 3 kidney\"     GERD (gastroesophageal reflux disease)      Hearing loss      History of transfusion      HTN (hypertension)      Hypertriglyceridemia      Melanoma (H) 05/2015    Rt Bicep     Septicemia (H)      SVT (supraventricular tachycardia) (H) 1999     Traumatic rhabdomyolysis (H)      Past Surgical History:   Procedure Laterality Date     CATARACT " EXTRACTION Bilateral      JOINT REPLACEMENT       OTHER SURGICAL HISTORY Left     Laser retinal surgery     OTHER SURGICAL HISTORY Left 03/10/2020    IRRIGATION AND DEBRIDEMENT KNEE ARTHROPLASTY/ Poly Exchange      ME ESOPHAGOGASTRODUODENOSCOPY TRANSORAL DIAGNOSTIC N/A 3/19/2020    Procedure: ESOPHAGOGASTRODUODENOSCOPY (EGD);  Surgeon: Joby Brice MD;  Location: [facility] Main OR;  Service: Gastroenterology     REPLACEMENT TOTAL KNEE Left      RETINAL DETACHMENT SURGERY Right 1980     TOTAL KNEE ARTHROPLASTY Right      Family History   Problem Relation Age of Onset     Heart Failure Sister 80.00     Pancreatic Cancer Brother         Social History     Socioeconomic History     Marital status:      Spouse name: Not on file     Number of children: Not on file     Years of education: Not on file     Highest education level: Not on file   Occupational History     Not on file   Tobacco Use     Smoking status: Never     Smokeless tobacco: Never   Substance and Sexual Activity     Alcohol use: No     Drug use: No     Sexual activity: Never   Other Topics Concern     Not on file   Social History Narrative     Not on file     Social Determinants of Health     Financial Resource Strain: Not on file   Food Insecurity: Not on file   Transportation Needs: Not on file   Physical Activity: Not on file   Stress: Not on file   Social Connections: Not on file   Intimate Partner Violence: Not on file   Housing Stability: Not on file           Medications  Allergies   Current Outpatient Medications   Medication Sig Dispense Refill     acetaminophen (TYLENOL) 500 MG tablet [ACETAMINOPHEN (TYLENOL) 500 MG TABLET] Take 1,000 mg by mouth every 6 (six) hours as needed for pain.       calcium carbonate-vitamin D3 (OS-DEVON 250+ D) 250-125 mg-unit Tab per tablet [CALCIUM CARBONATE-VITAMIN D3 (OS-DEVON 250+ D) 250-125 MG-UNIT TAB PER TABLET] Take 1 tablet by mouth daily.       calcium, as carbonate, (TUMS) 200 mg calcium (500 mg) chewable  tablet [CALCIUM, AS CARBONATE, (TUMS) 200 MG CALCIUM (500 MG) CHEWABLE TABLET] Chew 2 tablets every 2 (two) hours as needed for heartburn.        carbidopa-levodopa (SINEMET)  MG tablet Take 1 tablet by mouth 3 times daily       cefadroxil (DURICEF) 500 MG capsule Take 500 mg by mouth 2 times daily       co-enzyme Q-10 100 MG CAPS capsule Take 100 mg by mouth daily       lisinopril (ZESTRIL) 5 MG tablet Take 1 tablet (5 mg) by mouth daily 90 tablet 3     omeprazole (PRILOSEC) 20 MG DR capsule Take 20 mg by mouth daily as needed       melatonin 3 mg Tab tablet Take 3 mg by mouth nightly as needed (Patient not taking: Reported on 3/22/2023)         Allergies   Allergen Reactions     Levaquin [Levofloxacin] Unknown     Tendon pain          Lab Results    Chemistry/lipid CBC Cardiac Enzymes/BNP/TSH/INR   Recent Labs   Lab Test 09/23/22  1519   CHOL 146   HDL 43   LDL 86   TRIG 85     Recent Labs   Lab Test 09/23/22  1519 07/23/18  1519 01/16/15  1413   LDL 86 89 88     Recent Labs   Lab Test 03/01/23  1212 03/01/23 0439   NA  --  133*   POTASSIUM 3.9 3.3*   CHLORIDE  --  103   CO2  --  24   GLC  --  90   BUN  --  15   CR  --  0.72   GFRESTIMATED  --  88   DEVON  --  8.2*     Recent Labs   Lab Test 03/01/23  0439 02/28/23 0457 02/26/23  0434   CR 0.72 0.69* 0.73     No results for input(s): A1C in the last 08835 hours.       Recent Labs   Lab Test 03/01/23 0439 02/28/23 0457   WBC  --  10.7   HGB 10.3* 10.1*   HCT  --  30.4*   MCV  --  90   PLT  --  335     Recent Labs   Lab Test 03/01/23  0439 02/28/23 0457 02/26/23  0434   HGB 10.3* 10.1* 11.1*    Recent Labs   Lab Test 02/24/23  0507 03/18/20 1940 02/14/20  1324   TROPONINI 0.05 <0.01 <0.01     Recent Labs   Lab Test 03/18/20 1940 07/17/18  1134   BNP 57 <10     Recent Labs   Lab Test 09/23/22  1519   TSH 1.41     Recent Labs   Lab Test 03/18/20  1940 07/17/18  0842   INR 1.12* 1.02        Amaya Aden MD

## 2023-04-10 NOTE — TELEPHONE ENCOUNTER
Per Dr. Aden's 3-22-23 visit note:  Plan:  1. Decrease lisinopril to 5 mg daily  2. Knee high compression stockings  3. Keep up with fluids, electrolytes  4. If blood pressures still low, feeling light headed then will want to stop the lisinopril    Follow-up pending 9/2023.  mg

## 2023-04-10 NOTE — TELEPHONE ENCOUNTER
"Rec'd return VM from home care nurse Komal who stated patient has been reporting low BP in am w/ home monitor (90/40) - at today's visit sitting /53, standing BP 90/45 w/ patient reporting of \"tunnel vision and feeling woozy\" - patient attempting to increase po fluids.      Left return msg for Komal informing her that update would be forwarded to Dr. Aden for recommendations.  mmg  "

## 2023-04-10 NOTE — TELEPHONE ENCOUNTER
[facility] Center    Phone Message    May a detailed message be left on voicemail: yes     Reason for Call: Other: Pt is still light headed and loses his periferal vision even though he is only taking lisinopril (ZESTRIL) 5 MG tablet.  Could this be discontinued all together?  Komal or another coworker nurse are in pt's home every week so can monitor closely.  please call Etta BAUTISTA to discuss.     Action Taken: Message routed to:  [facility]): cardio    Travel Screening: Not Applicable     Thank you!  [facility]

## 2023-04-12 NOTE — TELEPHONE ENCOUNTER
Left detailed msg for Komal (home care nurse) informing her of Dr. Aden's response / recommendations - requested call back with any further questions / concerns.  mg

## 2023-04-21 NOTE — TELEPHONE ENCOUNTER
[facility] Call Center    Phone Message    May a detailed message be left on voicemail: yes     Reason for Call: Other: Maday called in stating she never received a call or message in regards to her message she sent over on 4/10, educated her on message in chart and she stated she never received messgae in regards to what Dr. Aden is requesting. Please reach out to [phone] to further discuss.     Action Taken: Other: Cardiology    Travel Screening: Not Applicable     Thank you!  [facility]

## 2023-04-21 NOTE — TELEPHONE ENCOUNTER
Called Komal and reviewed tele note 4/10 and recommendation to stop lisinopril. Komal verbalized understanding, will continue monitoring and call back with further questions or concerns.